# Patient Record
Sex: MALE | Race: WHITE | NOT HISPANIC OR LATINO | Employment: OTHER | URBAN - NONMETROPOLITAN AREA
[De-identification: names, ages, dates, MRNs, and addresses within clinical notes are randomized per-mention and may not be internally consistent; named-entity substitution may affect disease eponyms.]

---

## 2018-08-17 ENCOUNTER — HOSPITAL ENCOUNTER (EMERGENCY)
Facility: HOSPITAL | Age: 58
Discharge: PSYCHIATRIC OR SUBSTANCE USE FACILITY | End: 2018-08-17
Attending: EMERGENCY MEDICINE | Admitting: EMERGENCY MEDICINE

## 2018-08-17 ENCOUNTER — HOSPITAL ENCOUNTER (INPATIENT)
Facility: HOSPITAL | Age: 58
LOS: 5 days | Discharge: HOME | End: 2018-08-22
Attending: PSYCHIATRY & NEUROLOGY | Admitting: PSYCHIATRY & NEUROLOGY

## 2018-08-17 VITALS
BODY MASS INDEX: 20.45 KG/M2 | HEART RATE: 68 BPM | OXYGEN SATURATION: 100 % | SYSTOLIC BLOOD PRESSURE: 108 MMHG | HEIGHT: 72 IN | DIASTOLIC BLOOD PRESSURE: 62 MMHG | TEMPERATURE: 98.2 F | RESPIRATION RATE: 18 BRPM | WEIGHT: 151 LBS

## 2018-08-17 DIAGNOSIS — F10.10 ALCOHOL ABUSE: Primary | ICD-10-CM

## 2018-08-17 DIAGNOSIS — F10.920 ALCOHOLIC INTOXICATION WITHOUT COMPLICATION (HCC): ICD-10-CM

## 2018-08-17 PROBLEM — F10.20 ETOH DEPENDENCE (HCC): Status: ACTIVE | Noted: 2018-08-17

## 2018-08-17 LAB
6-ACETYL MORPHINE: NEGATIVE
ALBUMIN SERPL-MCNC: 3.9 G/DL (ref 3.4–4.8)
ALBUMIN SERPL-MCNC: 4.5 G/DL (ref 3.4–4.8)
ALBUMIN/GLOB SERPL: 1.7 G/DL (ref 1.5–2.5)
ALBUMIN/GLOB SERPL: 1.8 G/DL (ref 1.5–2.5)
ALP SERPL-CCNC: 58 U/L (ref 40–129)
ALP SERPL-CCNC: 66 U/L (ref 40–129)
ALT SERPL W P-5'-P-CCNC: 19 U/L (ref 10–44)
ALT SERPL W P-5'-P-CCNC: 22 U/L (ref 10–44)
AMPHET+METHAMPHET UR QL: NEGATIVE
ANION GAP SERPL CALCULATED.3IONS-SCNC: 10.6 MMOL/L (ref 3.6–11.2)
ANION GAP SERPL CALCULATED.3IONS-SCNC: 6.9 MMOL/L (ref 3.6–11.2)
AST SERPL-CCNC: 27 U/L (ref 10–34)
AST SERPL-CCNC: 30 U/L (ref 10–34)
BARBITURATES UR QL SCN: NEGATIVE
BASOPHILS # BLD AUTO: 0.02 10*3/MM3 (ref 0–0.3)
BASOPHILS NFR BLD AUTO: 0.4 % (ref 0–2)
BENZODIAZ UR QL SCN: NEGATIVE
BILIRUB SERPL-MCNC: 0.4 MG/DL (ref 0.2–1.8)
BILIRUB SERPL-MCNC: 0.5 MG/DL (ref 0.2–1.8)
BILIRUB UR QL STRIP: NEGATIVE
BUN BLD-MCNC: 6 MG/DL (ref 7–21)
BUN BLD-MCNC: 6 MG/DL (ref 7–21)
BUN/CREAT SERPL: 8.3 (ref 7–25)
BUN/CREAT SERPL: 9.4 (ref 7–25)
BUPRENORPHINE SERPL-MCNC: NEGATIVE NG/ML
CALCIUM SPEC-SCNC: 8.5 MG/DL (ref 7.7–10)
CALCIUM SPEC-SCNC: 9 MG/DL (ref 7.7–10)
CANNABINOIDS SERPL QL: NEGATIVE
CHLORIDE SERPL-SCNC: 95 MMOL/L (ref 99–112)
CHLORIDE SERPL-SCNC: 97 MMOL/L (ref 99–112)
CLARITY UR: CLEAR
CO2 SERPL-SCNC: 23.4 MMOL/L (ref 24.3–31.9)
CO2 SERPL-SCNC: 26.1 MMOL/L (ref 24.3–31.9)
COCAINE UR QL: NEGATIVE
COLOR UR: YELLOW
CREAT BLD-MCNC: 0.64 MG/DL (ref 0.43–1.29)
CREAT BLD-MCNC: 0.72 MG/DL (ref 0.43–1.29)
DEPRECATED RDW RBC AUTO: 41.9 FL (ref 37–54)
EOSINOPHIL # BLD AUTO: 0 10*3/MM3 (ref 0–0.7)
EOSINOPHIL NFR BLD AUTO: 0 % (ref 0–5)
ERYTHROCYTE [DISTWIDTH] IN BLOOD BY AUTOMATED COUNT: 12.7 % (ref 11.5–14.5)
ETHANOL BLD-MCNC: 115 MG/DL
ETHANOL BLD-MCNC: 236 MG/DL
ETHANOL UR QL: 0.12 %
ETHANOL UR QL: 0.24 %
GFR SERPL CREATININE-BSD FRML MDRD: 111 ML/MIN/1.73
GFR SERPL CREATININE-BSD FRML MDRD: 128 ML/MIN/1.73
GLOBULIN UR ELPH-MCNC: 2.2 GM/DL
GLOBULIN UR ELPH-MCNC: 2.6 GM/DL
GLUCOSE BLD-MCNC: 74 MG/DL (ref 70–110)
GLUCOSE BLD-MCNC: 91 MG/DL (ref 70–110)
GLUCOSE UR STRIP-MCNC: NEGATIVE MG/DL
HCT VFR BLD AUTO: 36.5 % (ref 42–52)
HGB BLD-MCNC: 12.5 G/DL (ref 14–18)
HGB UR QL STRIP.AUTO: NEGATIVE
IMM GRANULOCYTES # BLD: 0.01 10*3/MM3 (ref 0–0.03)
IMM GRANULOCYTES NFR BLD: 0.2 % (ref 0–0.5)
KETONES UR QL STRIP: NEGATIVE
LEUKOCYTE ESTERASE UR QL STRIP.AUTO: NEGATIVE
LYMPHOCYTES # BLD AUTO: 1.88 10*3/MM3 (ref 1–3)
LYMPHOCYTES NFR BLD AUTO: 38.4 % (ref 21–51)
MAGNESIUM SERPL-MCNC: 1.8 MG/DL (ref 1.7–2.6)
MCH RBC QN AUTO: 32.1 PG (ref 27–33)
MCHC RBC AUTO-ENTMCNC: 34.2 G/DL (ref 33–37)
MCV RBC AUTO: 93.6 FL (ref 80–94)
METHADONE UR QL SCN: NEGATIVE
MONOCYTES # BLD AUTO: 0.49 10*3/MM3 (ref 0.1–0.9)
MONOCYTES NFR BLD AUTO: 10 % (ref 0–10)
NEUTROPHILS # BLD AUTO: 2.49 10*3/MM3 (ref 1.4–6.5)
NEUTROPHILS NFR BLD AUTO: 51 % (ref 30–70)
NITRITE UR QL STRIP: NEGATIVE
OPIATES UR QL: NEGATIVE
OSMOLALITY SERPL CALC.SUM OF ELEC: 254.3 MOSM/KG (ref 273–305)
OSMOLALITY SERPL CALC.SUM OF ELEC: 258.9 MOSM/KG (ref 273–305)
OXYCODONE UR QL SCN: NEGATIVE
PCP UR QL SCN: NEGATIVE
PH UR STRIP.AUTO: 5.5 [PH] (ref 5–8)
PLATELET # BLD AUTO: 231 10*3/MM3 (ref 130–400)
PMV BLD AUTO: 9.3 FL (ref 6–10)
POTASSIUM BLD-SCNC: 3.6 MMOL/L (ref 3.5–5.3)
POTASSIUM BLD-SCNC: 4.2 MMOL/L (ref 3.5–5.3)
PROT SERPL-MCNC: 6.1 G/DL (ref 6–8)
PROT SERPL-MCNC: 7.1 G/DL (ref 6–8)
PROT UR QL STRIP: NEGATIVE
RBC # BLD AUTO: 3.9 10*6/MM3 (ref 4.7–6.1)
SODIUM BLD-SCNC: 128 MMOL/L (ref 135–153)
SODIUM BLD-SCNC: 131 MMOL/L (ref 135–153)
SP GR UR STRIP: 1.01 (ref 1–1.03)
UROBILINOGEN UR QL STRIP: NORMAL
WBC NRBC COR # BLD: 4.89 10*3/MM3 (ref 4.5–12.5)

## 2018-08-17 PROCEDURE — 80307 DRUG TEST PRSMV CHEM ANLYZR: CPT | Performed by: PHYSICIAN ASSISTANT

## 2018-08-17 PROCEDURE — 25010000002 THIAMINE PER 100 MG: Performed by: PHYSICIAN ASSISTANT

## 2018-08-17 PROCEDURE — 96375 TX/PRO/DX INJ NEW DRUG ADDON: CPT

## 2018-08-17 PROCEDURE — 93005 ELECTROCARDIOGRAM TRACING: CPT | Performed by: PSYCHIATRY & NEUROLOGY

## 2018-08-17 PROCEDURE — 96376 TX/PRO/DX INJ SAME DRUG ADON: CPT

## 2018-08-17 PROCEDURE — 99285 EMERGENCY DEPT VISIT HI MDM: CPT

## 2018-08-17 PROCEDURE — 80053 COMPREHEN METABOLIC PANEL: CPT | Performed by: PHYSICIAN ASSISTANT

## 2018-08-17 PROCEDURE — 81003 URINALYSIS AUTO W/O SCOPE: CPT | Performed by: PHYSICIAN ASSISTANT

## 2018-08-17 PROCEDURE — 36415 COLL VENOUS BLD VENIPUNCTURE: CPT

## 2018-08-17 PROCEDURE — 25010000002 ONDANSETRON PER 1 MG: Performed by: EMERGENCY MEDICINE

## 2018-08-17 PROCEDURE — HZ2ZZZZ DETOXIFICATION SERVICES FOR SUBSTANCE ABUSE TREATMENT: ICD-10-PCS | Performed by: PSYCHIATRY & NEUROLOGY

## 2018-08-17 PROCEDURE — 25010000002 LORAZEPAM PER 2 MG: Performed by: EMERGENCY MEDICINE

## 2018-08-17 PROCEDURE — 83735 ASSAY OF MAGNESIUM: CPT | Performed by: PHYSICIAN ASSISTANT

## 2018-08-17 PROCEDURE — 85025 COMPLETE CBC W/AUTO DIFF WBC: CPT | Performed by: PHYSICIAN ASSISTANT

## 2018-08-17 PROCEDURE — 96365 THER/PROPH/DIAG IV INF INIT: CPT

## 2018-08-17 PROCEDURE — 25010000002 ONDANSETRON PER 1 MG: Performed by: PHYSICIAN ASSISTANT

## 2018-08-17 RX ORDER — TRAZODONE HYDROCHLORIDE 50 MG/1
50 TABLET ORAL NIGHTLY PRN
Status: DISCONTINUED | OUTPATIENT
Start: 2018-08-17 | End: 2018-08-22 | Stop reason: HOSPADM

## 2018-08-17 RX ORDER — LORAZEPAM 2 MG/1
2 TABLET ORAL
Status: DISPENSED | OUTPATIENT
Start: 2018-08-17 | End: 2018-08-18

## 2018-08-17 RX ORDER — ONDANSETRON 2 MG/ML
4 INJECTION INTRAMUSCULAR; INTRAVENOUS ONCE
Status: COMPLETED | OUTPATIENT
Start: 2018-08-17 | End: 2018-08-17

## 2018-08-17 RX ORDER — GLUCOSAMINE/D3/BOSWELLIA SERRA 1500MG-400
1 TABLET ORAL DAILY
Status: ON HOLD | COMMUNITY
End: 2021-06-26

## 2018-08-17 RX ORDER — ALUMINA, MAGNESIA, AND SIMETHICONE 2400; 2400; 240 MG/30ML; MG/30ML; MG/30ML
15 SUSPENSION ORAL EVERY 6 HOURS PRN
Status: DISCONTINUED | OUTPATIENT
Start: 2018-08-17 | End: 2018-08-22 | Stop reason: HOSPADM

## 2018-08-17 RX ORDER — ECHINACEA PURPUREA EXTRACT 125 MG
2 TABLET ORAL AS NEEDED
Status: DISCONTINUED | OUTPATIENT
Start: 2018-08-17 | End: 2018-08-22 | Stop reason: HOSPADM

## 2018-08-17 RX ORDER — BENZTROPINE MESYLATE 1 MG/ML
0.5 INJECTION INTRAMUSCULAR; INTRAVENOUS DAILY PRN
Status: DISCONTINUED | OUTPATIENT
Start: 2018-08-17 | End: 2018-08-22 | Stop reason: HOSPADM

## 2018-08-17 RX ORDER — LORAZEPAM 0.5 MG/1
0.5 TABLET ORAL
Status: COMPLETED | OUTPATIENT
Start: 2018-08-21 | End: 2018-08-21

## 2018-08-17 RX ORDER — GABAPENTIN 400 MG/1
400 CAPSULE ORAL 4 TIMES DAILY
Status: CANCELLED | OUTPATIENT
Start: 2018-08-18

## 2018-08-17 RX ORDER — NICOTINE 21 MG/24HR
1 PATCH, TRANSDERMAL 24 HOURS TRANSDERMAL EVERY 24 HOURS
Status: DISCONTINUED | OUTPATIENT
Start: 2018-08-18 | End: 2018-08-22 | Stop reason: HOSPADM

## 2018-08-17 RX ORDER — LORAZEPAM 0.5 MG/1
0.5 TABLET ORAL EVERY 4 HOURS PRN
Status: ACTIVE | OUTPATIENT
Start: 2018-08-21 | End: 2018-08-22

## 2018-08-17 RX ORDER — LORAZEPAM 1 MG/1
1 TABLET ORAL EVERY 4 HOURS PRN
Status: ACTIVE | OUTPATIENT
Start: 2018-08-20 | End: 2018-08-21

## 2018-08-17 RX ORDER — LORAZEPAM 1 MG/1
1 TABLET ORAL
Status: COMPLETED | OUTPATIENT
Start: 2018-08-20 | End: 2018-08-20

## 2018-08-17 RX ORDER — HYDROXYZINE 50 MG/1
50 TABLET, FILM COATED ORAL EVERY 6 HOURS PRN
Status: DISCONTINUED | OUTPATIENT
Start: 2018-08-17 | End: 2018-08-22 | Stop reason: HOSPADM

## 2018-08-17 RX ORDER — LORAZEPAM 2 MG/1
2 TABLET ORAL
Status: COMPLETED | OUTPATIENT
Start: 2018-08-18 | End: 2018-08-18

## 2018-08-17 RX ORDER — ONDANSETRON 4 MG/1
4 TABLET, FILM COATED ORAL EVERY 6 HOURS PRN
Status: DISCONTINUED | OUTPATIENT
Start: 2018-08-17 | End: 2018-08-22 | Stop reason: HOSPADM

## 2018-08-17 RX ORDER — GABAPENTIN 400 MG/1
400 CAPSULE ORAL 4 TIMES DAILY
Status: ON HOLD | COMMUNITY
End: 2021-06-26

## 2018-08-17 RX ORDER — LOPERAMIDE HYDROCHLORIDE 2 MG/1
2 CAPSULE ORAL 4 TIMES DAILY PRN
Status: DISCONTINUED | OUTPATIENT
Start: 2018-08-17 | End: 2018-08-22 | Stop reason: HOSPADM

## 2018-08-17 RX ORDER — THIAMINE HCL 50 MG
100 TABLET ORAL DAILY
Status: DISCONTINUED | OUTPATIENT
Start: 2018-08-18 | End: 2018-08-22 | Stop reason: HOSPADM

## 2018-08-17 RX ORDER — LORAZEPAM 2 MG/1
2 TABLET ORAL EVERY 4 HOURS PRN
Status: ACTIVE | OUTPATIENT
Start: 2018-08-18 | End: 2018-08-19

## 2018-08-17 RX ORDER — SODIUM CHLORIDE 0.9 % (FLUSH) 0.9 %
10 SYRINGE (ML) INJECTION AS NEEDED
Status: DISCONTINUED | OUTPATIENT
Start: 2018-08-17 | End: 2018-08-17 | Stop reason: HOSPADM

## 2018-08-17 RX ORDER — FAMOTIDINE 20 MG/1
20 TABLET, FILM COATED ORAL 2 TIMES DAILY PRN
Status: DISCONTINUED | OUTPATIENT
Start: 2018-08-17 | End: 2018-08-22 | Stop reason: HOSPADM

## 2018-08-17 RX ORDER — LORAZEPAM 2 MG/ML
1 INJECTION INTRAMUSCULAR ONCE
Status: COMPLETED | OUTPATIENT
Start: 2018-08-17 | End: 2018-08-17

## 2018-08-17 RX ORDER — BENZTROPINE MESYLATE 1 MG/1
1 TABLET ORAL DAILY PRN
Status: DISCONTINUED | OUTPATIENT
Start: 2018-08-17 | End: 2018-08-22 | Stop reason: HOSPADM

## 2018-08-17 RX ORDER — BENZONATATE 100 MG/1
100 CAPSULE ORAL 3 TIMES DAILY PRN
Status: DISCONTINUED | OUTPATIENT
Start: 2018-08-17 | End: 2018-08-22 | Stop reason: HOSPADM

## 2018-08-17 RX ORDER — MULTIVITAMIN
1 TABLET ORAL DAILY
Status: DISCONTINUED | OUTPATIENT
Start: 2018-08-18 | End: 2018-08-22 | Stop reason: HOSPADM

## 2018-08-17 RX ORDER — GABAPENTIN 100 MG/1
CAPSULE ORAL
Status: ON HOLD | COMMUNITY
End: 2018-08-17 | Stop reason: DRUGHIGH

## 2018-08-17 RX ORDER — IBUPROFEN 600 MG/1
600 TABLET ORAL EVERY 6 HOURS PRN
Status: DISCONTINUED | OUTPATIENT
Start: 2018-08-17 | End: 2018-08-22 | Stop reason: HOSPADM

## 2018-08-17 RX ORDER — GLUCOSAMINE/D3/BOSWELLIA SERRA 1500MG-400
1 TABLET ORAL DAILY
Status: CANCELLED | OUTPATIENT
Start: 2018-08-18

## 2018-08-17 RX ADMIN — LORAZEPAM 1 MG: 2 INJECTION INTRAMUSCULAR; INTRAVENOUS at 19:47

## 2018-08-17 RX ADMIN — IBUPROFEN 600 MG: 600 TABLET ORAL at 23:59

## 2018-08-17 RX ADMIN — THIAMINE HYDROCHLORIDE 999 ML/HR: 100 INJECTION, SOLUTION INTRAMUSCULAR; INTRAVENOUS at 18:12

## 2018-08-17 RX ADMIN — ONDANSETRON 4 MG: 2 INJECTION, SOLUTION INTRAMUSCULAR; INTRAVENOUS at 19:43

## 2018-08-17 RX ADMIN — ONDANSETRON 4 MG: 4 TABLET, FILM COATED ORAL at 23:59

## 2018-08-17 RX ADMIN — ONDANSETRON 4 MG: 2 INJECTION, SOLUTION INTRAMUSCULAR; INTRAVENOUS at 18:20

## 2018-08-18 PROBLEM — F10.239 ALCOHOL DEPENDENCE WITH WITHDRAWAL (HCC): Status: ACTIVE | Noted: 2018-08-17

## 2018-08-18 PROCEDURE — 99223 1ST HOSP IP/OBS HIGH 75: CPT | Performed by: PSYCHIATRY & NEUROLOGY

## 2018-08-18 RX ORDER — GABAPENTIN 400 MG/1
400 CAPSULE ORAL EVERY 8 HOURS SCHEDULED
Status: DISCONTINUED | OUTPATIENT
Start: 2018-08-18 | End: 2018-08-22 | Stop reason: HOSPADM

## 2018-08-18 RX ADMIN — LORAZEPAM 2 MG: 2 TABLET ORAL at 14:32

## 2018-08-18 RX ADMIN — LORAZEPAM 2 MG: 2 TABLET ORAL at 08:59

## 2018-08-18 RX ADMIN — THIAMINE HCL (VITAMIN B1) 50 MG TABLET 100 MG: at 08:58

## 2018-08-18 RX ADMIN — TRAZODONE HYDROCHLORIDE 50 MG: 50 TABLET ORAL at 21:46

## 2018-08-18 RX ADMIN — LORAZEPAM 2 MG: 2 TABLET ORAL at 01:47

## 2018-08-18 RX ADMIN — TRAZODONE HYDROCHLORIDE 50 MG: 50 TABLET ORAL at 01:41

## 2018-08-18 RX ADMIN — GABAPENTIN 400 MG: 400 CAPSULE ORAL at 15:31

## 2018-08-18 RX ADMIN — FAMOTIDINE 20 MG: 20 TABLET, FILM COATED ORAL at 18:17

## 2018-08-18 RX ADMIN — IBUPROFEN 600 MG: 600 TABLET ORAL at 08:59

## 2018-08-18 RX ADMIN — ONDANSETRON 4 MG: 4 TABLET, FILM COATED ORAL at 14:33

## 2018-08-18 RX ADMIN — LORAZEPAM 2 MG: 2 TABLET ORAL at 21:46

## 2018-08-18 RX ADMIN — GABAPENTIN 400 MG: 400 CAPSULE ORAL at 21:46

## 2018-08-19 PROBLEM — E55.9 VITAMIN D DEFICIENCY: Status: ACTIVE | Noted: 2018-08-19

## 2018-08-19 LAB
25(OH)D3 SERPL-MCNC: 23 NG/ML
ANION GAP SERPL CALCULATED.3IONS-SCNC: 5.2 MMOL/L (ref 3.6–11.2)
BUN BLD-MCNC: 9 MG/DL (ref 7–21)
BUN/CREAT SERPL: 12 (ref 7–25)
CALCIUM SPEC-SCNC: 9.1 MG/DL (ref 7.7–10)
CHLORIDE SERPL-SCNC: 103 MMOL/L (ref 99–112)
CO2 SERPL-SCNC: 26.8 MMOL/L (ref 24.3–31.9)
CREAT BLD-MCNC: 0.75 MG/DL (ref 0.43–1.29)
GFR SERPL CREATININE-BSD FRML MDRD: 106 ML/MIN/1.73
GLUCOSE BLD-MCNC: 98 MG/DL (ref 70–110)
OSMOLALITY SERPL CALC.SUM OF ELEC: 268.8 MOSM/KG (ref 273–305)
POTASSIUM BLD-SCNC: 4 MMOL/L (ref 3.5–5.3)
SODIUM BLD-SCNC: 135 MMOL/L (ref 135–153)
TSH SERPL DL<=0.05 MIU/L-ACNC: 0.74 MIU/ML (ref 0.55–4.78)
VIT B12 BLD-MCNC: 371 PG/ML (ref 211–911)

## 2018-08-19 PROCEDURE — 82607 VITAMIN B-12: CPT | Performed by: PSYCHIATRY & NEUROLOGY

## 2018-08-19 PROCEDURE — 84443 ASSAY THYROID STIM HORMONE: CPT | Performed by: PSYCHIATRY & NEUROLOGY

## 2018-08-19 PROCEDURE — 82306 VITAMIN D 25 HYDROXY: CPT | Performed by: PSYCHIATRY & NEUROLOGY

## 2018-08-19 PROCEDURE — 99232 SBSQ HOSP IP/OBS MODERATE 35: CPT | Performed by: PSYCHIATRY & NEUROLOGY

## 2018-08-19 PROCEDURE — 80048 BASIC METABOLIC PNL TOTAL CA: CPT | Performed by: PSYCHIATRY & NEUROLOGY

## 2018-08-19 RX ORDER — HYDROXYZINE HYDROCHLORIDE 25 MG/1
TABLET, FILM COATED ORAL
Status: COMPLETED
Start: 2018-08-19 | End: 2018-08-19

## 2018-08-19 RX ORDER — FLUOXETINE HYDROCHLORIDE 20 MG/1
20 CAPSULE ORAL DAILY
Status: DISCONTINUED | OUTPATIENT
Start: 2018-08-19 | End: 2018-08-22 | Stop reason: HOSPADM

## 2018-08-19 RX ADMIN — THIAMINE HCL (VITAMIN B1) 50 MG TABLET 100 MG: at 08:35

## 2018-08-19 RX ADMIN — TRAZODONE HYDROCHLORIDE 50 MG: 50 TABLET ORAL at 21:26

## 2018-08-19 RX ADMIN — LORAZEPAM 1.5 MG: 0.5 TABLET ORAL at 14:11

## 2018-08-19 RX ADMIN — HYDROXYZINE HYDROCHLORIDE 50 MG: 50 TABLET, FILM COATED ORAL at 21:29

## 2018-08-19 RX ADMIN — IBUPROFEN 600 MG: 600 TABLET ORAL at 08:35

## 2018-08-19 RX ADMIN — FLUOXETINE 20 MG: 20 CAPSULE ORAL at 16:11

## 2018-08-19 RX ADMIN — GABAPENTIN 400 MG: 400 CAPSULE ORAL at 21:27

## 2018-08-19 RX ADMIN — CHOLECALCIFEROL CAP 125 MCG (5000 UNIT) 5000 UNITS: 125 CAP at 16:11

## 2018-08-19 RX ADMIN — LORAZEPAM 1.5 MG: 0.5 TABLET ORAL at 21:27

## 2018-08-19 RX ADMIN — LORAZEPAM 1.5 MG: 0.5 TABLET ORAL at 08:35

## 2018-08-19 RX ADMIN — HYDROXYZINE HYDROCHLORIDE 50 MG: 25 TABLET ORAL at 21:29

## 2018-08-19 RX ADMIN — GABAPENTIN 400 MG: 400 CAPSULE ORAL at 14:11

## 2018-08-19 RX ADMIN — GABAPENTIN 400 MG: 400 CAPSULE ORAL at 06:13

## 2018-08-19 RX ADMIN — Medication 1 TABLET: at 08:35

## 2018-08-20 PROCEDURE — 99232 SBSQ HOSP IP/OBS MODERATE 35: CPT | Performed by: PSYCHIATRY & NEUROLOGY

## 2018-08-20 RX ADMIN — CHOLECALCIFEROL CAP 125 MCG (5000 UNIT) 5000 UNITS: 125 CAP at 09:26

## 2018-08-20 RX ADMIN — GABAPENTIN 400 MG: 400 CAPSULE ORAL at 21:27

## 2018-08-20 RX ADMIN — THIAMINE HCL (VITAMIN B1) 50 MG TABLET 100 MG: at 09:26

## 2018-08-20 RX ADMIN — GABAPENTIN 400 MG: 400 CAPSULE ORAL at 14:14

## 2018-08-20 RX ADMIN — FLUOXETINE 20 MG: 20 CAPSULE ORAL at 09:26

## 2018-08-20 RX ADMIN — GABAPENTIN 400 MG: 400 CAPSULE ORAL at 06:17

## 2018-08-20 RX ADMIN — LORAZEPAM 1 MG: 1 TABLET ORAL at 14:14

## 2018-08-20 RX ADMIN — LORAZEPAM 1 MG: 1 TABLET ORAL at 21:27

## 2018-08-20 RX ADMIN — IBUPROFEN 600 MG: 600 TABLET ORAL at 21:27

## 2018-08-20 RX ADMIN — LORAZEPAM 1 MG: 1 TABLET ORAL at 09:26

## 2018-08-20 RX ADMIN — TRAZODONE HYDROCHLORIDE 50 MG: 50 TABLET ORAL at 21:26

## 2018-08-20 RX ADMIN — HYDROXYZINE HYDROCHLORIDE 50 MG: 50 TABLET, FILM COATED ORAL at 21:26

## 2018-08-20 RX ADMIN — Medication 1 TABLET: at 09:26

## 2018-08-21 PROCEDURE — 99231 SBSQ HOSP IP/OBS SF/LOW 25: CPT | Performed by: PSYCHIATRY & NEUROLOGY

## 2018-08-21 RX ADMIN — LORAZEPAM 0.5 MG: 0.5 TABLET ORAL at 14:42

## 2018-08-21 RX ADMIN — IBUPROFEN 600 MG: 600 TABLET ORAL at 14:42

## 2018-08-21 RX ADMIN — CHOLECALCIFEROL CAP 125 MCG (5000 UNIT) 5000 UNITS: 125 CAP at 08:26

## 2018-08-21 RX ADMIN — Medication 1 TABLET: at 08:26

## 2018-08-21 RX ADMIN — FLUOXETINE 20 MG: 20 CAPSULE ORAL at 08:26

## 2018-08-21 RX ADMIN — THIAMINE HCL (VITAMIN B1) 50 MG TABLET 100 MG: at 08:26

## 2018-08-21 RX ADMIN — LORAZEPAM 0.5 MG: 0.5 TABLET ORAL at 21:11

## 2018-08-21 RX ADMIN — GABAPENTIN 400 MG: 400 CAPSULE ORAL at 21:11

## 2018-08-21 RX ADMIN — GABAPENTIN 400 MG: 400 CAPSULE ORAL at 06:09

## 2018-08-21 RX ADMIN — IBUPROFEN 600 MG: 600 TABLET ORAL at 08:27

## 2018-08-21 RX ADMIN — IBUPROFEN 600 MG: 600 TABLET ORAL at 21:11

## 2018-08-21 RX ADMIN — GABAPENTIN 400 MG: 400 CAPSULE ORAL at 14:41

## 2018-08-21 RX ADMIN — HYDROXYZINE HYDROCHLORIDE 50 MG: 50 TABLET, FILM COATED ORAL at 21:11

## 2018-08-21 RX ADMIN — HYDROXYZINE HYDROCHLORIDE 50 MG: 50 TABLET, FILM COATED ORAL at 14:41

## 2018-08-21 RX ADMIN — TRAZODONE HYDROCHLORIDE 50 MG: 50 TABLET ORAL at 21:11

## 2018-08-21 RX ADMIN — LORAZEPAM 0.5 MG: 0.5 TABLET ORAL at 08:27

## 2018-08-22 VITALS
HEIGHT: 72 IN | HEART RATE: 73 BPM | BODY MASS INDEX: 19.91 KG/M2 | OXYGEN SATURATION: 96 % | DIASTOLIC BLOOD PRESSURE: 72 MMHG | SYSTOLIC BLOOD PRESSURE: 107 MMHG | RESPIRATION RATE: 18 BRPM | TEMPERATURE: 98.1 F | WEIGHT: 147 LBS

## 2018-08-22 PROCEDURE — 99238 HOSP IP/OBS DSCHRG MGMT 30/<: CPT | Performed by: PSYCHIATRY & NEUROLOGY

## 2018-08-22 RX ORDER — HYDROXYZINE 50 MG/1
50 TABLET, FILM COATED ORAL EVERY 8 HOURS PRN
Qty: 60 TABLET | Refills: 0 | Status: SHIPPED | OUTPATIENT
Start: 2018-08-22 | End: 2020-01-07

## 2018-08-22 RX ORDER — FLUOXETINE HYDROCHLORIDE 20 MG/1
20 CAPSULE ORAL DAILY
Qty: 30 CAPSULE | Refills: 0 | Status: SHIPPED | OUTPATIENT
Start: 2018-08-23 | End: 2020-01-07

## 2018-08-22 RX ORDER — LANOLIN ALCOHOL/MO/W.PET/CERES
100 CREAM (GRAM) TOPICAL DAILY
Qty: 30 TABLET | Refills: 0 | Status: SHIPPED | OUTPATIENT
Start: 2018-08-23 | End: 2020-01-07

## 2018-08-22 RX ADMIN — GABAPENTIN 400 MG: 400 CAPSULE ORAL at 14:14

## 2018-08-22 RX ADMIN — HYDROXYZINE HYDROCHLORIDE 50 MG: 50 TABLET, FILM COATED ORAL at 09:20

## 2018-08-22 RX ADMIN — FLUOXETINE 20 MG: 20 CAPSULE ORAL at 09:16

## 2018-08-22 RX ADMIN — IBUPROFEN 600 MG: 600 TABLET ORAL at 11:49

## 2018-08-22 RX ADMIN — THIAMINE HCL (VITAMIN B1) 50 MG TABLET 100 MG: at 09:16

## 2018-08-22 RX ADMIN — GABAPENTIN 400 MG: 400 CAPSULE ORAL at 06:03

## 2018-08-22 RX ADMIN — CHOLECALCIFEROL CAP 125 MCG (5000 UNIT) 5000 UNITS: 125 CAP at 09:16

## 2018-08-22 RX ADMIN — Medication 1 TABLET: at 09:16

## 2019-07-16 ENCOUNTER — TRANSCRIBE ORDERS (OUTPATIENT)
Dept: ADMINISTRATIVE | Facility: HOSPITAL | Age: 59
End: 2019-07-16

## 2019-07-16 DIAGNOSIS — Z87.891 PERSONAL HISTORY OF TOBACCO USE, PRESENTING HAZARDS TO HEALTH: Primary | ICD-10-CM

## 2019-10-28 ENCOUNTER — TRANSCRIBE ORDERS (OUTPATIENT)
Dept: LAB | Facility: HOSPITAL | Age: 59
End: 2019-10-28

## 2019-10-28 ENCOUNTER — HOSPITAL ENCOUNTER (OUTPATIENT)
Dept: GENERAL RADIOLOGY | Facility: HOSPITAL | Age: 59
Discharge: HOME | End: 2019-10-28
Admitting: FAMILY MEDICINE

## 2019-10-28 DIAGNOSIS — M54.50 LOW BACK PAIN, UNSPECIFIED BACK PAIN LATERALITY, UNSPECIFIED CHRONICITY, UNSPECIFIED WHETHER SCIATICA PRESENT: ICD-10-CM

## 2019-10-28 DIAGNOSIS — M54.50 LOW BACK PAIN, UNSPECIFIED BACK PAIN LATERALITY, UNSPECIFIED CHRONICITY, UNSPECIFIED WHETHER SCIATICA PRESENT: Primary | ICD-10-CM

## 2019-10-28 PROCEDURE — 72110 X-RAY EXAM L-2 SPINE 4/>VWS: CPT | Performed by: RADIOLOGY

## 2019-10-28 PROCEDURE — 72110 X-RAY EXAM L-2 SPINE 4/>VWS: CPT

## 2020-01-07 ENCOUNTER — DISEASE STATE MANAGEMENT VISIT (OUTPATIENT)
Dept: PHARMACY | Facility: HOSPITAL | Age: 60
End: 2020-01-07

## 2020-01-07 RX ORDER — CYCLOBENZAPRINE HCL 5 MG
5 TABLET ORAL NIGHTLY PRN
Status: ON HOLD | COMMUNITY
End: 2021-06-26

## 2020-01-07 RX ORDER — IPRATROPIUM BROMIDE AND ALBUTEROL SULFATE 2.5; .5 MG/3ML; MG/3ML
3 SOLUTION RESPIRATORY (INHALATION) EVERY 4 HOURS PRN
Status: ON HOLD | COMMUNITY
End: 2021-06-26

## 2020-01-07 RX ORDER — QUETIAPINE FUMARATE 100 MG/1
100 TABLET, FILM COATED ORAL NIGHTLY
COMMUNITY

## 2020-01-07 RX ORDER — ALBUTEROL SULFATE 2.5 MG/3ML
2.5 SOLUTION RESPIRATORY (INHALATION) EVERY 4 HOURS PRN
Status: ON HOLD | COMMUNITY
End: 2021-06-26

## 2020-01-07 RX ORDER — PANTOPRAZOLE SODIUM 40 MG/1
40 TABLET, DELAYED RELEASE ORAL DAILY
Status: ON HOLD | COMMUNITY
End: 2021-06-26

## 2020-01-21 ENCOUNTER — TELEPHONE (OUTPATIENT)
Dept: PHARMACY | Facility: HOSPITAL | Age: 60
End: 2020-01-21

## 2020-07-27 ENCOUNTER — TRANSCRIBE ORDERS (OUTPATIENT)
Dept: ADMINISTRATIVE | Facility: HOSPITAL | Age: 60
End: 2020-07-27

## 2020-07-27 DIAGNOSIS — Z87.891 PERSONAL HISTORY OF NICOTINE DEPENDENCE: Primary | ICD-10-CM

## 2020-08-04 ENCOUNTER — HOSPITAL ENCOUNTER (OUTPATIENT)
Dept: CT IMAGING | Facility: HOSPITAL | Age: 60
Discharge: HOME | End: 2020-08-04
Admitting: FAMILY MEDICINE

## 2020-08-04 DIAGNOSIS — Z87.891 PERSONAL HISTORY OF NICOTINE DEPENDENCE: ICD-10-CM

## 2020-08-04 PROCEDURE — G0297 LDCT FOR LUNG CA SCREEN: HCPCS | Performed by: RADIOLOGY

## 2020-08-04 PROCEDURE — G0297 LDCT FOR LUNG CA SCREEN: HCPCS

## 2020-10-07 ENCOUNTER — TRANSCRIBE ORDERS (OUTPATIENT)
Dept: ULTRASOUND IMAGING | Facility: HOSPITAL | Age: 60
End: 2020-10-07

## 2020-10-07 DIAGNOSIS — R22.31 LOCALIZED SWELLING, MASS, OR LUMP OF UPPER EXTREMITY, RIGHT: Primary | ICD-10-CM

## 2020-10-08 ENCOUNTER — APPOINTMENT (OUTPATIENT)
Dept: ULTRASOUND IMAGING | Facility: HOSPITAL | Age: 60
End: 2020-10-08

## 2020-10-12 ENCOUNTER — HOSPITAL ENCOUNTER (OUTPATIENT)
Dept: ULTRASOUND IMAGING | Facility: HOSPITAL | Age: 60
Discharge: HOME | End: 2020-10-12

## 2020-10-12 ENCOUNTER — TRANSCRIBE ORDERS (OUTPATIENT)
Dept: LAB | Facility: HOSPITAL | Age: 60
End: 2020-10-12

## 2020-10-12 DIAGNOSIS — R22.31 LOCALIZED SWELLING, MASS, OR LUMP OF UPPER EXTREMITY, RIGHT: ICD-10-CM

## 2020-10-12 DIAGNOSIS — R22.31 MASS OF FINGER OF RIGHT HAND: Primary | ICD-10-CM

## 2020-10-12 DIAGNOSIS — M54.2 CERVICALGIA: ICD-10-CM

## 2020-10-12 PROCEDURE — 93880 EXTRACRANIAL BILAT STUDY: CPT

## 2020-10-12 PROCEDURE — 76882 US LMTD JT/FCL EVL NVASC XTR: CPT

## 2020-10-12 PROCEDURE — 76882 US LMTD JT/FCL EVL NVASC XTR: CPT | Performed by: RADIOLOGY

## 2020-10-12 PROCEDURE — 93880 EXTRACRANIAL BILAT STUDY: CPT | Performed by: RADIOLOGY

## 2021-06-03 ENCOUNTER — TRANSCRIBE ORDERS (OUTPATIENT)
Dept: ADMINISTRATIVE | Facility: HOSPITAL | Age: 61
End: 2021-06-03

## 2021-06-03 DIAGNOSIS — R91.1 COIN LESION: Primary | ICD-10-CM

## 2021-06-25 ENCOUNTER — HOSPITAL ENCOUNTER (EMERGENCY)
Facility: HOSPITAL | Age: 61
Discharge: PSYCHIATRIC OR SUBSTANCE USE FACILITY | End: 2021-06-26
Attending: EMERGENCY MEDICINE | Admitting: EMERGENCY MEDICINE

## 2021-06-25 DIAGNOSIS — F10.20 ALCOHOLISM (HCC): Primary | ICD-10-CM

## 2021-06-25 LAB
6-ACETYL MORPHINE: NEGATIVE
ALBUMIN SERPL-MCNC: 4.46 G/DL (ref 3.5–5.2)
ALBUMIN/GLOB SERPL: 1.7 G/DL
ALP SERPL-CCNC: 69 U/L (ref 39–117)
ALT SERPL W P-5'-P-CCNC: 78 U/L (ref 1–41)
AMPHET+METHAMPHET UR QL: NEGATIVE
ANION GAP SERPL CALCULATED.3IONS-SCNC: 12.6 MMOL/L (ref 5–15)
AST SERPL-CCNC: 129 U/L (ref 1–40)
BARBITURATES UR QL SCN: NEGATIVE
BASOPHILS # BLD AUTO: 0.06 10*3/MM3 (ref 0–0.2)
BASOPHILS NFR BLD AUTO: 1.1 % (ref 0–1.5)
BENZODIAZ UR QL SCN: NEGATIVE
BILIRUB SERPL-MCNC: 0.5 MG/DL (ref 0–1.2)
BILIRUB UR QL STRIP: NEGATIVE
BUN SERPL-MCNC: 8 MG/DL (ref 8–23)
BUN/CREAT SERPL: 9.4 (ref 7–25)
BUPRENORPHINE SERPL-MCNC: NEGATIVE NG/ML
CALCIUM SPEC-SCNC: 9.4 MG/DL (ref 8.6–10.5)
CANNABINOIDS SERPL QL: POSITIVE
CHLORIDE SERPL-SCNC: 102 MMOL/L (ref 98–107)
CLARITY UR: CLEAR
CO2 SERPL-SCNC: 27.4 MMOL/L (ref 22–29)
COCAINE UR QL: NEGATIVE
COLOR UR: YELLOW
CREAT SERPL-MCNC: 0.85 MG/DL (ref 0.76–1.27)
DEPRECATED RDW RBC AUTO: 46.6 FL (ref 37–54)
EOSINOPHIL # BLD AUTO: 0.02 10*3/MM3 (ref 0–0.4)
EOSINOPHIL NFR BLD AUTO: 0.4 % (ref 0.3–6.2)
ERYTHROCYTE [DISTWIDTH] IN BLOOD BY AUTOMATED COUNT: 13.1 % (ref 12.3–15.4)
ETHANOL BLD-MCNC: 299 MG/DL (ref 0–10)
ETHANOL UR QL: 0.3 %
FLUAV RNA RESP QL NAA+PROBE: NOT DETECTED
FLUBV RNA RESP QL NAA+PROBE: NOT DETECTED
GFR SERPL CREATININE-BSD FRML MDRD: 91 ML/MIN/1.73
GLOBULIN UR ELPH-MCNC: 2.6 GM/DL
GLUCOSE SERPL-MCNC: 163 MG/DL (ref 65–99)
GLUCOSE UR STRIP-MCNC: NEGATIVE MG/DL
HCT VFR BLD AUTO: 39.9 % (ref 37.5–51)
HGB BLD-MCNC: 13.5 G/DL (ref 13–17.7)
HGB UR QL STRIP.AUTO: NEGATIVE
IMM GRANULOCYTES # BLD AUTO: 0.01 10*3/MM3 (ref 0–0.05)
IMM GRANULOCYTES NFR BLD AUTO: 0.2 % (ref 0–0.5)
KETONES UR QL STRIP: NEGATIVE
LEUKOCYTE ESTERASE UR QL STRIP.AUTO: NEGATIVE
LIPASE SERPL-CCNC: 42 U/L (ref 13–60)
LYMPHOCYTES # BLD AUTO: 2.09 10*3/MM3 (ref 0.7–3.1)
LYMPHOCYTES NFR BLD AUTO: 39.1 % (ref 19.6–45.3)
MAGNESIUM SERPL-MCNC: 2 MG/DL (ref 1.6–2.4)
MCH RBC QN AUTO: 32.8 PG (ref 26.6–33)
MCHC RBC AUTO-ENTMCNC: 33.8 G/DL (ref 31.5–35.7)
MCV RBC AUTO: 96.8 FL (ref 79–97)
METHADONE UR QL SCN: NEGATIVE
MONOCYTES # BLD AUTO: 0.68 10*3/MM3 (ref 0.1–0.9)
MONOCYTES NFR BLD AUTO: 12.7 % (ref 5–12)
NEUTROPHILS NFR BLD AUTO: 2.49 10*3/MM3 (ref 1.7–7)
NEUTROPHILS NFR BLD AUTO: 46.5 % (ref 42.7–76)
NITRITE UR QL STRIP: NEGATIVE
NRBC BLD AUTO-RTO: 0 /100 WBC (ref 0–0.2)
OPIATES UR QL: NEGATIVE
OXYCODONE UR QL SCN: NEGATIVE
PCP UR QL SCN: NEGATIVE
PH UR STRIP.AUTO: 6.5 [PH] (ref 5–8)
PLATELET # BLD AUTO: 249 10*3/MM3 (ref 140–450)
PMV BLD AUTO: 9.5 FL (ref 6–12)
POTASSIUM SERPL-SCNC: 3.9 MMOL/L (ref 3.5–5.2)
PROT SERPL-MCNC: 7.1 G/DL (ref 6–8.5)
PROT UR QL STRIP: NEGATIVE
RBC # BLD AUTO: 4.12 10*6/MM3 (ref 4.14–5.8)
SARS-COV-2 RNA RESP QL NAA+PROBE: NOT DETECTED
SODIUM SERPL-SCNC: 142 MMOL/L (ref 136–145)
SP GR UR STRIP: 1.01 (ref 1–1.03)
UROBILINOGEN UR QL STRIP: NORMAL
WBC # BLD AUTO: 5.35 10*3/MM3 (ref 3.4–10.8)

## 2021-06-25 PROCEDURE — 85025 COMPLETE CBC W/AUTO DIFF WBC: CPT | Performed by: EMERGENCY MEDICINE

## 2021-06-25 PROCEDURE — 96365 THER/PROPH/DIAG IV INF INIT: CPT

## 2021-06-25 PROCEDURE — 80307 DRUG TEST PRSMV CHEM ANLYZR: CPT | Performed by: EMERGENCY MEDICINE

## 2021-06-25 PROCEDURE — 82077 ASSAY SPEC XCP UR&BREATH IA: CPT | Performed by: EMERGENCY MEDICINE

## 2021-06-25 PROCEDURE — 25010000002 THIAMINE PER 100 MG: Performed by: EMERGENCY MEDICINE

## 2021-06-25 PROCEDURE — 80053 COMPREHEN METABOLIC PANEL: CPT | Performed by: EMERGENCY MEDICINE

## 2021-06-25 PROCEDURE — 87636 SARSCOV2 & INF A&B AMP PRB: CPT | Performed by: EMERGENCY MEDICINE

## 2021-06-25 PROCEDURE — 99285 EMERGENCY DEPT VISIT HI MDM: CPT

## 2021-06-25 PROCEDURE — 36415 COLL VENOUS BLD VENIPUNCTURE: CPT

## 2021-06-25 PROCEDURE — 81003 URINALYSIS AUTO W/O SCOPE: CPT | Performed by: EMERGENCY MEDICINE

## 2021-06-25 PROCEDURE — 83735 ASSAY OF MAGNESIUM: CPT | Performed by: EMERGENCY MEDICINE

## 2021-06-25 PROCEDURE — 83690 ASSAY OF LIPASE: CPT | Performed by: EMERGENCY MEDICINE

## 2021-06-25 RX ORDER — LORAZEPAM 1 MG/1
1 TABLET ORAL ONCE
Status: COMPLETED | OUTPATIENT
Start: 2021-06-25 | End: 2021-06-25

## 2021-06-25 RX ADMIN — LORAZEPAM 1 MG: 1 TABLET ORAL at 21:45

## 2021-06-25 RX ADMIN — SODIUM CHLORIDE 1000 ML: 9 INJECTION, SOLUTION INTRAVENOUS at 23:18

## 2021-06-25 RX ADMIN — THIAMINE HYDROCHLORIDE 1000 ML/HR: 100 INJECTION, SOLUTION INTRAMUSCULAR; INTRAVENOUS at 21:36

## 2021-06-26 ENCOUNTER — HOSPITAL ENCOUNTER (INPATIENT)
Facility: HOSPITAL | Age: 61
LOS: 5 days | Discharge: HOME | End: 2021-07-01
Attending: PSYCHIATRY & NEUROLOGY | Admitting: PSYCHIATRY & NEUROLOGY

## 2021-06-26 VITALS
HEART RATE: 83 BPM | HEIGHT: 72 IN | SYSTOLIC BLOOD PRESSURE: 147 MMHG | OXYGEN SATURATION: 96 % | TEMPERATURE: 98.9 F | DIASTOLIC BLOOD PRESSURE: 88 MMHG | BODY MASS INDEX: 19.1 KG/M2 | WEIGHT: 141 LBS | RESPIRATION RATE: 17 BRPM

## 2021-06-26 PROBLEM — F10.20 ETOH DEPENDENCE (HCC): Status: ACTIVE | Noted: 2021-06-26

## 2021-06-26 LAB
ETHANOL BLD-MCNC: 117 MG/DL (ref 0–10)
ETHANOL BLD-MCNC: 76 MG/DL (ref 0–10)
ETHANOL UR QL: 0.08 %
ETHANOL UR QL: 0.12 %
QT INTERVAL: 398 MS
QTC INTERVAL: 459 MS

## 2021-06-26 PROCEDURE — 82077 ASSAY SPEC XCP UR&BREATH IA: CPT | Performed by: EMERGENCY MEDICINE

## 2021-06-26 PROCEDURE — 94640 AIRWAY INHALATION TREATMENT: CPT

## 2021-06-26 PROCEDURE — HZ2ZZZZ DETOXIFICATION SERVICES FOR SUBSTANCE ABUSE TREATMENT: ICD-10-PCS | Performed by: PSYCHIATRY & NEUROLOGY

## 2021-06-26 PROCEDURE — 93010 ELECTROCARDIOGRAM REPORT: CPT | Performed by: INTERNAL MEDICINE

## 2021-06-26 PROCEDURE — 99223 1ST HOSP IP/OBS HIGH 75: CPT | Performed by: PSYCHIATRY & NEUROLOGY

## 2021-06-26 PROCEDURE — 82077 ASSAY SPEC XCP UR&BREATH IA: CPT | Performed by: STUDENT IN AN ORGANIZED HEALTH CARE EDUCATION/TRAINING PROGRAM

## 2021-06-26 PROCEDURE — 93005 ELECTROCARDIOGRAM TRACING: CPT | Performed by: PSYCHIATRY & NEUROLOGY

## 2021-06-26 RX ORDER — LORAZEPAM 1 MG/1
1 TABLET ORAL EVERY 4 HOURS PRN
Status: ACTIVE | OUTPATIENT
Start: 2021-06-29 | End: 2021-06-30

## 2021-06-26 RX ORDER — IBUPROFEN 400 MG/1
400 TABLET ORAL EVERY 6 HOURS PRN
Status: DISCONTINUED | OUTPATIENT
Start: 2021-06-26 | End: 2021-07-01 | Stop reason: HOSPADM

## 2021-06-26 RX ORDER — MULTIPLE VITAMINS W/ MINERALS TAB 9MG-400MCG
1 TAB ORAL DAILY
Status: DISCONTINUED | OUTPATIENT
Start: 2021-06-26 | End: 2021-07-01 | Stop reason: HOSPADM

## 2021-06-26 RX ORDER — QUETIAPINE FUMARATE 100 MG/1
100 TABLET, FILM COATED ORAL NIGHTLY
Status: DISCONTINUED | OUTPATIENT
Start: 2021-06-26 | End: 2021-07-01 | Stop reason: HOSPADM

## 2021-06-26 RX ORDER — ALBUTEROL SULFATE 2.5 MG/3ML
2.5 SOLUTION RESPIRATORY (INHALATION) EVERY 4 HOURS PRN
Status: DISCONTINUED | OUTPATIENT
Start: 2021-06-26 | End: 2021-06-29

## 2021-06-26 RX ORDER — VARENICLINE TARTRATE 0.5 MG/1
1 TABLET, FILM COATED ORAL 2 TIMES DAILY
Status: DISCONTINUED | OUTPATIENT
Start: 2021-06-26 | End: 2021-07-01 | Stop reason: HOSPADM

## 2021-06-26 RX ORDER — BENZTROPINE MESYLATE 1 MG/ML
1 INJECTION INTRAMUSCULAR; INTRAVENOUS ONCE AS NEEDED
Status: DISCONTINUED | OUTPATIENT
Start: 2021-06-26 | End: 2021-07-01 | Stop reason: HOSPADM

## 2021-06-26 RX ORDER — HYDROXYZINE 50 MG/1
50 TABLET, FILM COATED ORAL EVERY 6 HOURS PRN
Status: DISCONTINUED | OUTPATIENT
Start: 2021-06-26 | End: 2021-07-01 | Stop reason: HOSPADM

## 2021-06-26 RX ORDER — TRAZODONE HYDROCHLORIDE 50 MG/1
50 TABLET ORAL NIGHTLY PRN
Status: DISCONTINUED | OUTPATIENT
Start: 2021-06-26 | End: 2021-07-01 | Stop reason: HOSPADM

## 2021-06-26 RX ORDER — CELECOXIB 100 MG/1
100 CAPSULE ORAL 2 TIMES DAILY
COMMUNITY

## 2021-06-26 RX ORDER — LORAZEPAM 2 MG/1
2 TABLET ORAL
Status: COMPLETED | OUTPATIENT
Start: 2021-06-27 | End: 2021-06-27

## 2021-06-26 RX ORDER — VARENICLINE TARTRATE 1 MG/1
1 TABLET, FILM COATED ORAL 2 TIMES DAILY
COMMUNITY

## 2021-06-26 RX ORDER — LORAZEPAM 1 MG/1
1 TABLET ORAL
Status: COMPLETED | OUTPATIENT
Start: 2021-06-29 | End: 2021-06-29

## 2021-06-26 RX ORDER — CELECOXIB 100 MG/1
100 CAPSULE ORAL 2 TIMES DAILY
Status: DISCONTINUED | OUTPATIENT
Start: 2021-06-26 | End: 2021-07-01 | Stop reason: HOSPADM

## 2021-06-26 RX ORDER — LORAZEPAM 0.5 MG/1
0.5 TABLET ORAL EVERY 4 HOURS PRN
Status: ACTIVE | OUTPATIENT
Start: 2021-06-30 | End: 2021-07-01

## 2021-06-26 RX ORDER — PANTOPRAZOLE SODIUM 40 MG/1
40 TABLET, DELAYED RELEASE ORAL DAILY
Status: DISCONTINUED | OUTPATIENT
Start: 2021-06-27 | End: 2021-07-01 | Stop reason: HOSPADM

## 2021-06-26 RX ORDER — NITROGLYCERIN 0.4 MG/1
0.4 TABLET SUBLINGUAL
Status: DISCONTINUED | OUTPATIENT
Start: 2021-06-26 | End: 2021-07-01 | Stop reason: HOSPADM

## 2021-06-26 RX ORDER — BENZONATATE 100 MG/1
100 CAPSULE ORAL 3 TIMES DAILY PRN
Status: DISCONTINUED | OUTPATIENT
Start: 2021-06-26 | End: 2021-07-01 | Stop reason: HOSPADM

## 2021-06-26 RX ORDER — LORAZEPAM 2 MG/1
2 TABLET ORAL EVERY 4 HOURS PRN
Status: DISPENSED | OUTPATIENT
Start: 2021-06-27 | End: 2021-06-28

## 2021-06-26 RX ORDER — ALBUTEROL SULFATE 90 UG/1
2 AEROSOL, METERED RESPIRATORY (INHALATION) EVERY 4 HOURS PRN
COMMUNITY

## 2021-06-26 RX ORDER — TRAZODONE HYDROCHLORIDE 50 MG/1
50 TABLET ORAL NIGHTLY
COMMUNITY

## 2021-06-26 RX ORDER — NITROGLYCERIN 0.4 MG/1
0.4 TABLET SUBLINGUAL
COMMUNITY

## 2021-06-26 RX ORDER — ECHINACEA PURPUREA EXTRACT 125 MG
2 TABLET ORAL AS NEEDED
Status: DISCONTINUED | OUTPATIENT
Start: 2021-06-26 | End: 2021-07-01 | Stop reason: HOSPADM

## 2021-06-26 RX ORDER — LORAZEPAM 0.5 MG/1
0.5 TABLET ORAL
Status: COMPLETED | OUTPATIENT
Start: 2021-06-30 | End: 2021-06-30

## 2021-06-26 RX ORDER — PANTOPRAZOLE SODIUM 20 MG/1
20 TABLET, DELAYED RELEASE ORAL DAILY
COMMUNITY

## 2021-06-26 RX ORDER — BENZTROPINE MESYLATE 1 MG/1
2 TABLET ORAL ONCE AS NEEDED
Status: DISCONTINUED | OUTPATIENT
Start: 2021-06-26 | End: 2021-07-01 | Stop reason: HOSPADM

## 2021-06-26 RX ORDER — BUDESONIDE AND FORMOTEROL FUMARATE DIHYDRATE 80; 4.5 UG/1; UG/1
2 AEROSOL RESPIRATORY (INHALATION)
Status: DISCONTINUED | OUTPATIENT
Start: 2021-06-26 | End: 2021-07-01 | Stop reason: HOSPADM

## 2021-06-26 RX ORDER — FAMOTIDINE 20 MG/1
20 TABLET, FILM COATED ORAL 2 TIMES DAILY PRN
Status: DISCONTINUED | OUTPATIENT
Start: 2021-06-26 | End: 2021-07-01 | Stop reason: HOSPADM

## 2021-06-26 RX ORDER — ACETAMINOPHEN 325 MG/1
650 TABLET ORAL EVERY 6 HOURS PRN
Status: DISCONTINUED | OUTPATIENT
Start: 2021-06-26 | End: 2021-07-01 | Stop reason: HOSPADM

## 2021-06-26 RX ORDER — ALUMINA, MAGNESIA, AND SIMETHICONE 2400; 2400; 240 MG/30ML; MG/30ML; MG/30ML
15 SUSPENSION ORAL EVERY 6 HOURS PRN
Status: DISCONTINUED | OUTPATIENT
Start: 2021-06-26 | End: 2021-07-01 | Stop reason: HOSPADM

## 2021-06-26 RX ORDER — CYCLOBENZAPRINE HCL 10 MG
10 TABLET ORAL 3 TIMES DAILY
COMMUNITY

## 2021-06-26 RX ORDER — TIOTROPIUM BROMIDE INHALATION SPRAY 3.12 UG/1
2 SPRAY, METERED RESPIRATORY (INHALATION)
COMMUNITY

## 2021-06-26 RX ORDER — TRAZODONE HYDROCHLORIDE 50 MG/1
50 TABLET ORAL NIGHTLY
Status: DISCONTINUED | OUTPATIENT
Start: 2021-06-26 | End: 2021-07-01 | Stop reason: HOSPADM

## 2021-06-26 RX ORDER — CYCLOBENZAPRINE HCL 10 MG
10 TABLET ORAL 3 TIMES DAILY
Status: DISCONTINUED | OUTPATIENT
Start: 2021-06-26 | End: 2021-07-01 | Stop reason: HOSPADM

## 2021-06-26 RX ORDER — LOPERAMIDE HYDROCHLORIDE 2 MG/1
2 CAPSULE ORAL
Status: DISCONTINUED | OUTPATIENT
Start: 2021-06-26 | End: 2021-07-01 | Stop reason: HOSPADM

## 2021-06-26 RX ORDER — ONDANSETRON 4 MG/1
4 TABLET, FILM COATED ORAL EVERY 6 HOURS PRN
Status: DISCONTINUED | OUTPATIENT
Start: 2021-06-26 | End: 2021-07-01 | Stop reason: HOSPADM

## 2021-06-26 RX ORDER — LORAZEPAM 2 MG/1
2 TABLET ORAL
Status: DISPENSED | OUTPATIENT
Start: 2021-06-26 | End: 2021-06-27

## 2021-06-26 RX ADMIN — ACETAMINOPHEN 650 MG: 325 TABLET ORAL at 09:23

## 2021-06-26 RX ADMIN — ACETAMINOPHEN 650 MG: 325 TABLET ORAL at 18:17

## 2021-06-26 RX ADMIN — BUDESONIDE AND FORMOTEROL FUMARATE DIHYDRATE 2 PUFF: 80; 4.5 AEROSOL RESPIRATORY (INHALATION) at 21:58

## 2021-06-26 RX ADMIN — QUETIAPINE FUMARATE 100 MG: 100 TABLET ORAL at 21:20

## 2021-06-26 RX ADMIN — CELECOXIB 100 MG: 100 CAPSULE ORAL at 21:20

## 2021-06-26 RX ADMIN — TRAZODONE HYDROCHLORIDE 50 MG: 50 TABLET ORAL at 21:20

## 2021-06-26 RX ADMIN — CYCLOBENZAPRINE HYDROCHLORIDE 10 MG: 10 TABLET, FILM COATED ORAL at 21:20

## 2021-06-26 RX ADMIN — LORAZEPAM 2 MG: 2 TABLET ORAL at 09:58

## 2021-06-26 RX ADMIN — VARENICLINE TARTRATE 1 MG: 0.5 TABLET, FILM COATED ORAL at 21:20

## 2021-06-26 RX ADMIN — Medication 100 MG: at 09:23

## 2021-06-26 RX ADMIN — LORAZEPAM 2 MG: 2 TABLET ORAL at 21:20

## 2021-06-26 RX ADMIN — LORAZEPAM 2 MG: 2 TABLET ORAL at 18:17

## 2021-06-26 RX ADMIN — Medication 1 TABLET: at 09:24

## 2021-06-27 PROCEDURE — 99232 SBSQ HOSP IP/OBS MODERATE 35: CPT | Performed by: PSYCHIATRY & NEUROLOGY

## 2021-06-27 PROCEDURE — 94799 UNLISTED PULMONARY SVC/PX: CPT

## 2021-06-27 RX ADMIN — TRAZODONE HYDROCHLORIDE 50 MG: 50 TABLET ORAL at 21:09

## 2021-06-27 RX ADMIN — QUETIAPINE FUMARATE 100 MG: 100 TABLET ORAL at 21:09

## 2021-06-27 RX ADMIN — LORAZEPAM 2 MG: 2 TABLET ORAL at 21:09

## 2021-06-27 RX ADMIN — VARENICLINE TARTRATE 1 MG: 0.5 TABLET, FILM COATED ORAL at 21:09

## 2021-06-27 RX ADMIN — Medication 1 TABLET: at 08:24

## 2021-06-27 RX ADMIN — CYCLOBENZAPRINE HYDROCHLORIDE 10 MG: 10 TABLET, FILM COATED ORAL at 17:07

## 2021-06-27 RX ADMIN — LORAZEPAM 2 MG: 2 TABLET ORAL at 00:55

## 2021-06-27 RX ADMIN — BUDESONIDE AND FORMOTEROL FUMARATE DIHYDRATE 2 PUFF: 80; 4.5 AEROSOL RESPIRATORY (INHALATION) at 11:40

## 2021-06-27 RX ADMIN — Medication 100 MG: at 08:24

## 2021-06-27 RX ADMIN — CYCLOBENZAPRINE HYDROCHLORIDE 10 MG: 10 TABLET, FILM COATED ORAL at 08:24

## 2021-06-27 RX ADMIN — CELECOXIB 100 MG: 100 CAPSULE ORAL at 21:09

## 2021-06-27 RX ADMIN — LORAZEPAM 2 MG: 2 TABLET ORAL at 08:25

## 2021-06-27 RX ADMIN — BUDESONIDE AND FORMOTEROL FUMARATE DIHYDRATE 2 PUFF: 80; 4.5 AEROSOL RESPIRATORY (INHALATION) at 21:09

## 2021-06-27 RX ADMIN — CYCLOBENZAPRINE HYDROCHLORIDE 10 MG: 10 TABLET, FILM COATED ORAL at 21:09

## 2021-06-27 RX ADMIN — CELECOXIB 100 MG: 100 CAPSULE ORAL at 08:27

## 2021-06-27 RX ADMIN — HYDROXYZINE HYDROCHLORIDE 50 MG: 50 TABLET ORAL at 11:39

## 2021-06-27 RX ADMIN — LORAZEPAM 2 MG: 2 TABLET ORAL at 14:02

## 2021-06-27 RX ADMIN — PANTOPRAZOLE SODIUM 40 MG: 40 TABLET, DELAYED RELEASE ORAL at 08:24

## 2021-06-28 PROCEDURE — 94799 UNLISTED PULMONARY SVC/PX: CPT

## 2021-06-28 PROCEDURE — U0004 COV-19 TEST NON-CDC HGH THRU: HCPCS | Performed by: PSYCHIATRY & NEUROLOGY

## 2021-06-28 PROCEDURE — 99232 SBSQ HOSP IP/OBS MODERATE 35: CPT | Performed by: PSYCHIATRY & NEUROLOGY

## 2021-06-28 RX ADMIN — Medication 100 MG: at 08:43

## 2021-06-28 RX ADMIN — TUBERCULIN PURIFIED PROTEIN DERIVATIVE 5 UNITS: 5 INJECTION, SOLUTION INTRADERMAL at 17:51

## 2021-06-28 RX ADMIN — LORAZEPAM 1.5 MG: 0.5 TABLET ORAL at 21:09

## 2021-06-28 RX ADMIN — CYCLOBENZAPRINE HYDROCHLORIDE 10 MG: 10 TABLET, FILM COATED ORAL at 08:42

## 2021-06-28 RX ADMIN — LORAZEPAM 1.5 MG: 0.5 TABLET ORAL at 14:15

## 2021-06-28 RX ADMIN — QUETIAPINE FUMARATE 100 MG: 100 TABLET ORAL at 21:09

## 2021-06-28 RX ADMIN — TRAZODONE HYDROCHLORIDE 50 MG: 50 TABLET ORAL at 01:00

## 2021-06-28 RX ADMIN — LORAZEPAM 2 MG: 2 TABLET ORAL at 01:00

## 2021-06-28 RX ADMIN — IBUPROFEN 400 MG: 400 TABLET, FILM COATED ORAL at 19:04

## 2021-06-28 RX ADMIN — CYCLOBENZAPRINE HYDROCHLORIDE 10 MG: 10 TABLET, FILM COATED ORAL at 15:33

## 2021-06-28 RX ADMIN — TRAZODONE HYDROCHLORIDE 50 MG: 50 TABLET ORAL at 21:09

## 2021-06-28 RX ADMIN — LORAZEPAM 1.5 MG: 0.5 TABLET ORAL at 08:42

## 2021-06-28 RX ADMIN — PANTOPRAZOLE SODIUM 40 MG: 40 TABLET, DELAYED RELEASE ORAL at 08:43

## 2021-06-28 RX ADMIN — Medication 1 TABLET: at 08:43

## 2021-06-28 RX ADMIN — CYCLOBENZAPRINE HYDROCHLORIDE 10 MG: 10 TABLET, FILM COATED ORAL at 21:09

## 2021-06-29 LAB — SARS-COV-2 RNA NOSE QL NAA+PROBE: NOT DETECTED

## 2021-06-29 PROCEDURE — 99232 SBSQ HOSP IP/OBS MODERATE 35: CPT | Performed by: PSYCHIATRY & NEUROLOGY

## 2021-06-29 PROCEDURE — 94799 UNLISTED PULMONARY SVC/PX: CPT

## 2021-06-29 RX ADMIN — BUDESONIDE AND FORMOTEROL FUMARATE DIHYDRATE 2 PUFF: 80; 4.5 AEROSOL RESPIRATORY (INHALATION) at 08:40

## 2021-06-29 RX ADMIN — LORAZEPAM 1 MG: 1 TABLET ORAL at 21:27

## 2021-06-29 RX ADMIN — PANTOPRAZOLE SODIUM 40 MG: 40 TABLET, DELAYED RELEASE ORAL at 08:40

## 2021-06-29 RX ADMIN — CYCLOBENZAPRINE HYDROCHLORIDE 10 MG: 10 TABLET, FILM COATED ORAL at 16:54

## 2021-06-29 RX ADMIN — CYCLOBENZAPRINE HYDROCHLORIDE 10 MG: 10 TABLET, FILM COATED ORAL at 21:27

## 2021-06-29 RX ADMIN — Medication 100 MG: at 08:40

## 2021-06-29 RX ADMIN — HYDROXYZINE HYDROCHLORIDE 50 MG: 50 TABLET ORAL at 08:40

## 2021-06-29 RX ADMIN — Medication 1 TABLET: at 08:40

## 2021-06-29 RX ADMIN — QUETIAPINE FUMARATE 100 MG: 100 TABLET ORAL at 21:27

## 2021-06-29 RX ADMIN — CELECOXIB 100 MG: 100 CAPSULE ORAL at 21:27

## 2021-06-29 RX ADMIN — LORAZEPAM 1 MG: 1 TABLET ORAL at 08:40

## 2021-06-29 RX ADMIN — LORAZEPAM 1 MG: 1 TABLET ORAL at 14:26

## 2021-06-29 RX ADMIN — TRAZODONE HYDROCHLORIDE 50 MG: 50 TABLET ORAL at 21:27

## 2021-06-29 RX ADMIN — CYCLOBENZAPRINE HYDROCHLORIDE 10 MG: 10 TABLET, FILM COATED ORAL at 08:40

## 2021-06-30 PROCEDURE — 99232 SBSQ HOSP IP/OBS MODERATE 35: CPT | Performed by: PSYCHIATRY & NEUROLOGY

## 2021-06-30 RX ADMIN — TRAZODONE HYDROCHLORIDE 50 MG: 50 TABLET ORAL at 21:13

## 2021-06-30 RX ADMIN — Medication 1 TABLET: at 08:59

## 2021-06-30 RX ADMIN — QUETIAPINE FUMARATE 100 MG: 100 TABLET ORAL at 21:14

## 2021-06-30 RX ADMIN — VARENICLINE TARTRATE 1 MG: 0.5 TABLET, FILM COATED ORAL at 21:14

## 2021-06-30 RX ADMIN — LORAZEPAM 0.5 MG: 0.5 TABLET ORAL at 21:14

## 2021-06-30 RX ADMIN — Medication 100 MG: at 09:00

## 2021-06-30 RX ADMIN — BUDESONIDE AND FORMOTEROL FUMARATE DIHYDRATE 2 PUFF: 80; 4.5 AEROSOL RESPIRATORY (INHALATION) at 09:00

## 2021-06-30 RX ADMIN — LORAZEPAM 0.5 MG: 0.5 TABLET ORAL at 14:43

## 2021-06-30 RX ADMIN — LORAZEPAM 0.5 MG: 0.5 TABLET ORAL at 09:00

## 2021-06-30 RX ADMIN — CYCLOBENZAPRINE HYDROCHLORIDE 10 MG: 10 TABLET, FILM COATED ORAL at 08:59

## 2021-06-30 RX ADMIN — IBUPROFEN 400 MG: 400 TABLET, FILM COATED ORAL at 14:43

## 2021-06-30 RX ADMIN — PANTOPRAZOLE SODIUM 40 MG: 40 TABLET, DELAYED RELEASE ORAL at 09:00

## 2021-06-30 RX ADMIN — CYCLOBENZAPRINE HYDROCHLORIDE 10 MG: 10 TABLET, FILM COATED ORAL at 16:50

## 2021-06-30 RX ADMIN — CYCLOBENZAPRINE HYDROCHLORIDE 10 MG: 10 TABLET, FILM COATED ORAL at 21:14

## 2021-06-30 RX ADMIN — VARENICLINE TARTRATE 1 MG: 0.5 TABLET, FILM COATED ORAL at 09:00

## 2021-07-01 VITALS
SYSTOLIC BLOOD PRESSURE: 106 MMHG | BODY MASS INDEX: 19.29 KG/M2 | DIASTOLIC BLOOD PRESSURE: 77 MMHG | WEIGHT: 142.4 LBS | HEART RATE: 98 BPM | HEIGHT: 72 IN | OXYGEN SATURATION: 94 % | RESPIRATION RATE: 18 BRPM | TEMPERATURE: 96.5 F

## 2021-07-01 PROCEDURE — 99238 HOSP IP/OBS DSCHRG MGMT 30/<: CPT | Performed by: PSYCHIATRY & NEUROLOGY

## 2021-07-01 RX ADMIN — CELECOXIB 100 MG: 100 CAPSULE ORAL at 09:52

## 2021-07-01 RX ADMIN — PANTOPRAZOLE SODIUM 40 MG: 40 TABLET, DELAYED RELEASE ORAL at 09:52

## 2021-07-01 RX ADMIN — CYCLOBENZAPRINE HYDROCHLORIDE 10 MG: 10 TABLET, FILM COATED ORAL at 09:53

## 2021-07-01 RX ADMIN — Medication 1 TABLET: at 09:52

## 2021-07-01 RX ADMIN — BUDESONIDE AND FORMOTEROL FUMARATE DIHYDRATE 2 PUFF: 80; 4.5 AEROSOL RESPIRATORY (INHALATION) at 09:52

## 2021-07-01 RX ADMIN — Medication 100 MG: at 09:52

## 2021-08-04 ENCOUNTER — HOSPITAL ENCOUNTER (OUTPATIENT)
Dept: CT IMAGING | Facility: HOSPITAL | Age: 61
Discharge: HOME | End: 2021-08-04
Admitting: INTERNAL MEDICINE

## 2021-08-04 DIAGNOSIS — R91.1 COIN LESION: ICD-10-CM

## 2021-08-04 PROCEDURE — 71250 CT THORAX DX C-: CPT

## 2021-08-04 PROCEDURE — 71250 CT THORAX DX C-: CPT | Performed by: RADIOLOGY

## 2022-11-07 ENCOUNTER — HOSPITAL ENCOUNTER (OUTPATIENT)
Dept: GENERAL RADIOLOGY | Facility: HOSPITAL | Age: 62
Discharge: HOME | End: 2022-11-07
Admitting: FAMILY MEDICINE

## 2022-11-07 ENCOUNTER — TRANSCRIBE ORDERS (OUTPATIENT)
Dept: LAB | Facility: HOSPITAL | Age: 62
End: 2022-11-07

## 2022-11-07 DIAGNOSIS — J20.9 ACUTE BRONCHITIS, UNSPECIFIED ORGANISM: Primary | ICD-10-CM

## 2022-11-07 DIAGNOSIS — J20.9 ACUTE BRONCHITIS, UNSPECIFIED ORGANISM: ICD-10-CM

## 2022-11-07 PROCEDURE — 71046 X-RAY EXAM CHEST 2 VIEWS: CPT | Performed by: RADIOLOGY

## 2022-11-07 PROCEDURE — 71046 X-RAY EXAM CHEST 2 VIEWS: CPT

## 2022-12-15 ENCOUNTER — TRANSCRIBE ORDERS (OUTPATIENT)
Dept: ADMINISTRATIVE | Facility: HOSPITAL | Age: 62
End: 2022-12-15

## 2022-12-15 DIAGNOSIS — R91.1 PULMONARY NODULE, LEFT: Primary | ICD-10-CM

## 2022-12-22 ENCOUNTER — TRANSCRIBE ORDERS (OUTPATIENT)
Dept: ADMINISTRATIVE | Facility: HOSPITAL | Age: 62
End: 2022-12-22

## 2022-12-22 DIAGNOSIS — F17.210 CIGARETTE SMOKER: Primary | ICD-10-CM

## 2022-12-23 ENCOUNTER — APPOINTMENT (OUTPATIENT)
Dept: CT IMAGING | Facility: HOSPITAL | Age: 62
End: 2022-12-23

## 2022-12-27 ENCOUNTER — APPOINTMENT (OUTPATIENT)
Dept: CT IMAGING | Facility: HOSPITAL | Age: 62
End: 2022-12-27

## 2022-12-30 ENCOUNTER — HOSPITAL ENCOUNTER (OUTPATIENT)
Dept: CT IMAGING | Facility: HOSPITAL | Age: 62
Discharge: HOME | End: 2022-12-30
Admitting: NURSE PRACTITIONER

## 2022-12-30 DIAGNOSIS — F17.210 CIGARETTE SMOKER: ICD-10-CM

## 2022-12-30 PROCEDURE — 71271 CT THORAX LUNG CANCER SCR C-: CPT

## 2022-12-30 PROCEDURE — 71271 CT THORAX LUNG CANCER SCR C-: CPT | Performed by: RADIOLOGY

## 2023-01-11 ENCOUNTER — TRANSCRIBE ORDERS (OUTPATIENT)
Dept: LAB | Facility: HOSPITAL | Age: 63
End: 2023-01-11
Payer: MEDICARE

## 2023-01-11 ENCOUNTER — HOSPITAL ENCOUNTER (OUTPATIENT)
Dept: GENERAL RADIOLOGY | Facility: HOSPITAL | Age: 63
Discharge: HOME | End: 2023-01-11
Admitting: FAMILY MEDICINE
Payer: MEDICARE

## 2023-01-11 DIAGNOSIS — M54.2 CERVICALGIA: ICD-10-CM

## 2023-01-11 DIAGNOSIS — M54.2 CERVICALGIA: Primary | ICD-10-CM

## 2023-01-11 PROCEDURE — 72050 X-RAY EXAM NECK SPINE 4/5VWS: CPT

## 2023-01-11 PROCEDURE — 72050 X-RAY EXAM NECK SPINE 4/5VWS: CPT | Performed by: RADIOLOGY

## 2023-11-13 ENCOUNTER — TRANSCRIBE ORDERS (OUTPATIENT)
Dept: ADMINISTRATIVE | Facility: HOSPITAL | Age: 63
End: 2023-11-13
Payer: MEDICARE

## 2023-11-13 DIAGNOSIS — F17.210 CIGARETTE SMOKER: Primary | ICD-10-CM

## 2024-04-08 ENCOUNTER — HOSPITAL ENCOUNTER (EMERGENCY)
Facility: HOSPITAL | Age: 64
Discharge: PSYCHIATRIC OR SUBSTANCE USE FACILITY | DRG: 897 | End: 2024-04-09
Attending: STUDENT IN AN ORGANIZED HEALTH CARE EDUCATION/TRAINING PROGRAM | Admitting: STUDENT IN AN ORGANIZED HEALTH CARE EDUCATION/TRAINING PROGRAM
Payer: MEDICARE

## 2024-04-08 VITALS
TEMPERATURE: 98.3 F | RESPIRATION RATE: 18 BRPM | DIASTOLIC BLOOD PRESSURE: 90 MMHG | HEART RATE: 92 BPM | OXYGEN SATURATION: 95 % | HEIGHT: 72 IN | BODY MASS INDEX: 19.29 KG/M2 | WEIGHT: 142.42 LBS | SYSTOLIC BLOOD PRESSURE: 152 MMHG

## 2024-04-08 DIAGNOSIS — F10.10 ALCOHOL ABUSE: Primary | ICD-10-CM

## 2024-04-08 LAB
ALBUMIN SERPL-MCNC: 4.7 G/DL (ref 3.5–5.2)
ALBUMIN/GLOB SERPL: 1.7 G/DL
ALP SERPL-CCNC: 66 U/L (ref 39–117)
ALT SERPL W P-5'-P-CCNC: 25 U/L (ref 1–41)
AMPHET+METHAMPHET UR QL: NEGATIVE
AMPHETAMINES UR QL: NEGATIVE
ANION GAP SERPL CALCULATED.3IONS-SCNC: 13.6 MMOL/L (ref 5–15)
AST SERPL-CCNC: 31 U/L (ref 1–40)
BARBITURATES UR QL SCN: NEGATIVE
BASOPHILS # BLD AUTO: 0.05 10*3/MM3 (ref 0–0.2)
BASOPHILS NFR BLD AUTO: 0.9 % (ref 0–1.5)
BENZODIAZ UR QL SCN: NEGATIVE
BILIRUB SERPL-MCNC: 0.2 MG/DL (ref 0–1.2)
BILIRUB UR QL STRIP: NEGATIVE
BUN SERPL-MCNC: 11 MG/DL (ref 8–23)
BUN/CREAT SERPL: 12.5 (ref 7–25)
BUPRENORPHINE SERPL-MCNC: NEGATIVE NG/ML
CALCIUM SPEC-SCNC: 9.3 MG/DL (ref 8.6–10.5)
CANNABINOIDS SERPL QL: NEGATIVE
CHLORIDE SERPL-SCNC: 103 MMOL/L (ref 98–107)
CLARITY UR: CLEAR
CO2 SERPL-SCNC: 23.4 MMOL/L (ref 22–29)
COCAINE UR QL: NEGATIVE
COLOR UR: YELLOW
CREAT SERPL-MCNC: 0.88 MG/DL (ref 0.76–1.27)
DEPRECATED RDW RBC AUTO: 45 FL (ref 37–54)
EGFRCR SERPLBLD CKD-EPI 2021: 94.8 ML/MIN/1.73
EOSINOPHIL # BLD AUTO: 0.06 10*3/MM3 (ref 0–0.4)
EOSINOPHIL NFR BLD AUTO: 1.1 % (ref 0.3–6.2)
ERYTHROCYTE [DISTWIDTH] IN BLOOD BY AUTOMATED COUNT: 12.5 % (ref 12.3–15.4)
ETHANOL BLD-MCNC: 305 MG/DL (ref 0–10)
ETHANOL BLD-MCNC: 60 MG/DL (ref 0–10)
ETHANOL UR QL: 0.06 %
ETHANOL UR QL: 0.3 %
FENTANYL UR-MCNC: NEGATIVE NG/ML
GLOBULIN UR ELPH-MCNC: 2.7 GM/DL
GLUCOSE SERPL-MCNC: 113 MG/DL (ref 65–99)
GLUCOSE UR STRIP-MCNC: NEGATIVE MG/DL
HCT VFR BLD AUTO: 40.3 % (ref 37.5–51)
HGB BLD-MCNC: 13.8 G/DL (ref 13–17.7)
HGB UR QL STRIP.AUTO: NEGATIVE
IMM GRANULOCYTES # BLD AUTO: 0.02 10*3/MM3 (ref 0–0.05)
IMM GRANULOCYTES NFR BLD AUTO: 0.4 % (ref 0–0.5)
KETONES UR QL STRIP: NEGATIVE
LEUKOCYTE ESTERASE UR QL STRIP.AUTO: NEGATIVE
LYMPHOCYTES # BLD AUTO: 2.56 10*3/MM3 (ref 0.7–3.1)
LYMPHOCYTES NFR BLD AUTO: 46.7 % (ref 19.6–45.3)
MAGNESIUM SERPL-MCNC: 2.1 MG/DL (ref 1.6–2.4)
MCH RBC QN AUTO: 33.8 PG (ref 26.6–33)
MCHC RBC AUTO-ENTMCNC: 34.2 G/DL (ref 31.5–35.7)
MCV RBC AUTO: 98.8 FL (ref 79–97)
METHADONE UR QL SCN: NEGATIVE
MONOCYTES # BLD AUTO: 0.48 10*3/MM3 (ref 0.1–0.9)
MONOCYTES NFR BLD AUTO: 8.8 % (ref 5–12)
NEUTROPHILS NFR BLD AUTO: 2.31 10*3/MM3 (ref 1.7–7)
NEUTROPHILS NFR BLD AUTO: 42.1 % (ref 42.7–76)
NITRITE UR QL STRIP: NEGATIVE
NRBC BLD AUTO-RTO: 0 /100 WBC (ref 0–0.2)
OPIATES UR QL: NEGATIVE
OXYCODONE UR QL SCN: NEGATIVE
PCP UR QL SCN: NEGATIVE
PH UR STRIP.AUTO: 6 [PH] (ref 5–8)
PLATELET # BLD AUTO: 260 10*3/MM3 (ref 140–450)
PMV BLD AUTO: 9.5 FL (ref 6–12)
POTASSIUM SERPL-SCNC: 4.4 MMOL/L (ref 3.5–5.2)
PROT SERPL-MCNC: 7.4 G/DL (ref 6–8.5)
PROT UR QL STRIP: NEGATIVE
RBC # BLD AUTO: 4.08 10*6/MM3 (ref 4.14–5.8)
SODIUM SERPL-SCNC: 140 MMOL/L (ref 136–145)
SP GR UR STRIP: 1.01 (ref 1–1.03)
TRICYCLICS UR QL SCN: POSITIVE
UROBILINOGEN UR QL STRIP: NORMAL
WBC NRBC COR # BLD AUTO: 5.48 10*3/MM3 (ref 3.4–10.8)

## 2024-04-08 PROCEDURE — 82077 ASSAY SPEC XCP UR&BREATH IA: CPT | Performed by: PHYSICIAN ASSISTANT

## 2024-04-08 PROCEDURE — 81003 URINALYSIS AUTO W/O SCOPE: CPT | Performed by: PHYSICIAN ASSISTANT

## 2024-04-08 PROCEDURE — 80053 COMPREHEN METABOLIC PANEL: CPT | Performed by: PHYSICIAN ASSISTANT

## 2024-04-08 PROCEDURE — 83735 ASSAY OF MAGNESIUM: CPT | Performed by: PHYSICIAN ASSISTANT

## 2024-04-08 PROCEDURE — 99285 EMERGENCY DEPT VISIT HI MDM: CPT

## 2024-04-08 PROCEDURE — 36415 COLL VENOUS BLD VENIPUNCTURE: CPT

## 2024-04-08 PROCEDURE — 80307 DRUG TEST PRSMV CHEM ANLYZR: CPT | Performed by: PHYSICIAN ASSISTANT

## 2024-04-08 PROCEDURE — 85025 COMPLETE CBC W/AUTO DIFF WBC: CPT | Performed by: PHYSICIAN ASSISTANT

## 2024-04-08 RX ORDER — ACETAMINOPHEN 500 MG
1000 TABLET ORAL ONCE
Status: COMPLETED | OUTPATIENT
Start: 2024-04-08 | End: 2024-04-08

## 2024-04-08 RX ORDER — TRAMADOL HYDROCHLORIDE 50 MG/1
50 TABLET ORAL ONCE
Status: COMPLETED | OUTPATIENT
Start: 2024-04-08 | End: 2024-04-08

## 2024-04-08 RX ADMIN — TRAMADOL HYDROCHLORIDE 50 MG: 50 TABLET, COATED ORAL at 19:04

## 2024-04-08 RX ADMIN — ACETAMINOPHEN 1000 MG: 500 TABLET ORAL at 17:43

## 2024-04-09 ENCOUNTER — HOSPITAL ENCOUNTER (INPATIENT)
Facility: HOSPITAL | Age: 64
LOS: 4 days | Discharge: HOME | DRG: 897 | End: 2024-04-13
Attending: PSYCHIATRY & NEUROLOGY | Admitting: PSYCHIATRY & NEUROLOGY
Payer: MEDICARE

## 2024-04-09 PROBLEM — F10.10 ALCOHOL ABUSE: Status: ACTIVE | Noted: 2024-04-09

## 2024-04-09 PROBLEM — F10.10 ETOH ABUSE: Status: ACTIVE | Noted: 2024-04-09

## 2024-04-09 PROBLEM — I10 HTN (HYPERTENSION): Status: ACTIVE | Noted: 2024-04-09

## 2024-04-09 PROCEDURE — 99222 1ST HOSP IP/OBS MODERATE 55: CPT | Performed by: PSYCHIATRY & NEUROLOGY

## 2024-04-09 PROCEDURE — 93005 ELECTROCARDIOGRAM TRACING: CPT | Performed by: PSYCHIATRY & NEUROLOGY

## 2024-04-09 PROCEDURE — 93010 ELECTROCARDIOGRAM REPORT: CPT | Performed by: INTERNAL MEDICINE

## 2024-04-09 PROCEDURE — HZ2ZZZZ DETOXIFICATION SERVICES FOR SUBSTANCE ABUSE TREATMENT: ICD-10-PCS | Performed by: PSYCHIATRY & NEUROLOGY

## 2024-04-09 RX ORDER — LORAZEPAM 1 MG/1
1 TABLET ORAL EVERY 4 HOURS PRN
Status: ACTIVE | OUTPATIENT
Start: 2024-04-12 | End: 2024-04-13

## 2024-04-09 RX ORDER — LORAZEPAM 0.5 MG/1
0.5 TABLET ORAL EVERY 4 HOURS PRN
Status: DISCONTINUED | OUTPATIENT
Start: 2024-04-13 | End: 2024-04-13 | Stop reason: HOSPADM

## 2024-04-09 RX ORDER — HYDROXYZINE 50 MG/1
50 TABLET, FILM COATED ORAL EVERY 6 HOURS PRN
Status: DISCONTINUED | OUTPATIENT
Start: 2024-04-09 | End: 2024-04-13 | Stop reason: HOSPADM

## 2024-04-09 RX ORDER — ONDANSETRON 4 MG/1
4 TABLET, ORALLY DISINTEGRATING ORAL EVERY 6 HOURS PRN
Status: DISCONTINUED | OUTPATIENT
Start: 2024-04-09 | End: 2024-04-13 | Stop reason: HOSPADM

## 2024-04-09 RX ORDER — ECHINACEA PURPUREA EXTRACT 125 MG
2 TABLET ORAL AS NEEDED
Status: DISCONTINUED | OUTPATIENT
Start: 2024-04-09 | End: 2024-04-13 | Stop reason: HOSPADM

## 2024-04-09 RX ORDER — LORAZEPAM 0.5 MG/1
0.5 TABLET ORAL
Status: DISCONTINUED | OUTPATIENT
Start: 2024-04-13 | End: 2024-04-13 | Stop reason: HOSPADM

## 2024-04-09 RX ORDER — ISOSORBIDE MONONITRATE 30 MG/1
15 TABLET, EXTENDED RELEASE ORAL DAILY
Status: ON HOLD | COMMUNITY
End: 2024-04-09

## 2024-04-09 RX ORDER — NICOTINE 21 MG/24HR
1 PATCH, TRANSDERMAL 24 HOURS TRANSDERMAL
Status: DISCONTINUED | OUTPATIENT
Start: 2024-04-09 | End: 2024-04-13 | Stop reason: HOSPADM

## 2024-04-09 RX ORDER — TRAZODONE HYDROCHLORIDE 50 MG/1
50 TABLET ORAL NIGHTLY PRN
Status: DISCONTINUED | OUTPATIENT
Start: 2024-04-09 | End: 2024-04-13 | Stop reason: HOSPADM

## 2024-04-09 RX ORDER — LOPERAMIDE HYDROCHLORIDE 2 MG/1
2 CAPSULE ORAL
Status: DISCONTINUED | OUTPATIENT
Start: 2024-04-09 | End: 2024-04-13 | Stop reason: HOSPADM

## 2024-04-09 RX ORDER — DIAZEPAM 5 MG/1
10 TABLET ORAL ONCE
Status: COMPLETED | OUTPATIENT
Start: 2024-04-09 | End: 2024-04-09

## 2024-04-09 RX ORDER — LORAZEPAM 1 MG/1
1 TABLET ORAL
Status: COMPLETED | OUTPATIENT
Start: 2024-04-12 | End: 2024-04-12

## 2024-04-09 RX ORDER — CYCLOBENZAPRINE HCL 10 MG
10 TABLET ORAL DAILY
Status: DISCONTINUED | OUTPATIENT
Start: 2024-04-09 | End: 2024-04-13 | Stop reason: HOSPADM

## 2024-04-09 RX ORDER — LORAZEPAM 2 MG/1
2 TABLET ORAL ONCE
Status: COMPLETED | OUTPATIENT
Start: 2024-04-09 | End: 2024-04-09

## 2024-04-09 RX ORDER — FAMOTIDINE 20 MG/1
20 TABLET, FILM COATED ORAL 2 TIMES DAILY PRN
Status: DISCONTINUED | OUTPATIENT
Start: 2024-04-09 | End: 2024-04-13 | Stop reason: HOSPADM

## 2024-04-09 RX ORDER — IBUPROFEN 400 MG/1
400 TABLET ORAL EVERY 6 HOURS PRN
Status: DISCONTINUED | OUTPATIENT
Start: 2024-04-09 | End: 2024-04-13 | Stop reason: HOSPADM

## 2024-04-09 RX ORDER — LORAZEPAM 2 MG/1
2 TABLET ORAL
Status: COMPLETED | OUTPATIENT
Start: 2024-04-10 | End: 2024-04-10

## 2024-04-09 RX ORDER — BENZONATATE 100 MG/1
100 CAPSULE ORAL 3 TIMES DAILY PRN
Status: DISCONTINUED | OUTPATIENT
Start: 2024-04-09 | End: 2024-04-13 | Stop reason: HOSPADM

## 2024-04-09 RX ORDER — QUETIAPINE FUMARATE 300 MG/1
300 TABLET, FILM COATED ORAL NIGHTLY
COMMUNITY

## 2024-04-09 RX ORDER — MULTIPLE VITAMINS W/ MINERALS TAB 9MG-400MCG
1 TAB ORAL DAILY
Status: DISCONTINUED | OUTPATIENT
Start: 2024-04-09 | End: 2024-04-13 | Stop reason: HOSPADM

## 2024-04-09 RX ORDER — MULTIVITAMIN WITH IRON
2 TABLET ORAL DAILY
Status: DISCONTINUED | OUTPATIENT
Start: 2024-04-09 | End: 2024-04-13 | Stop reason: HOSPADM

## 2024-04-09 RX ORDER — ONDANSETRON 4 MG/1
4 TABLET, FILM COATED ORAL EVERY 8 HOURS PRN
Status: ON HOLD | COMMUNITY
End: 2024-04-09

## 2024-04-09 RX ORDER — QUETIAPINE FUMARATE 100 MG/1
300 TABLET, FILM COATED ORAL NIGHTLY
Status: DISCONTINUED | OUTPATIENT
Start: 2024-04-09 | End: 2024-04-13 | Stop reason: HOSPADM

## 2024-04-09 RX ORDER — LORAZEPAM 2 MG/1
2 TABLET ORAL EVERY 4 HOURS PRN
Status: DISPENSED | OUTPATIENT
Start: 2024-04-10 | End: 2024-04-11

## 2024-04-09 RX ORDER — ALUMINA, MAGNESIA, AND SIMETHICONE 2400; 2400; 240 MG/30ML; MG/30ML; MG/30ML
15 SUSPENSION ORAL EVERY 6 HOURS PRN
Status: DISCONTINUED | OUTPATIENT
Start: 2024-04-09 | End: 2024-04-13 | Stop reason: HOSPADM

## 2024-04-09 RX ORDER — TIZANIDINE 2 MG/1
2 TABLET ORAL 2 TIMES DAILY
Status: ON HOLD | COMMUNITY
End: 2024-04-09

## 2024-04-09 RX ORDER — LORAZEPAM 2 MG/1
2 TABLET ORAL
Status: DISPENSED | OUTPATIENT
Start: 2024-04-09 | End: 2024-04-10

## 2024-04-09 RX ORDER — ACETAMINOPHEN 325 MG/1
650 TABLET ORAL EVERY 6 HOURS PRN
Status: DISCONTINUED | OUTPATIENT
Start: 2024-04-09 | End: 2024-04-13 | Stop reason: HOSPADM

## 2024-04-09 RX ADMIN — CYCLOBENZAPRINE HYDROCHLORIDE 10 MG: 10 TABLET, FILM COATED ORAL at 12:14

## 2024-04-09 RX ADMIN — LORAZEPAM 2 MG: 2 TABLET ORAL at 17:05

## 2024-04-09 RX ADMIN — IBUPROFEN 400 MG: 400 TABLET, FILM COATED ORAL at 03:21

## 2024-04-09 RX ADMIN — HYDROXYZINE HYDROCHLORIDE 50 MG: 50 TABLET ORAL at 08:28

## 2024-04-09 RX ADMIN — LORAZEPAM 2 MG: 2 TABLET ORAL at 14:36

## 2024-04-09 RX ADMIN — METOPROLOL TARTRATE 12.5 MG: 25 TABLET, FILM COATED ORAL at 12:14

## 2024-04-09 RX ADMIN — ACETAMINOPHEN 650 MG: 325 TABLET ORAL at 08:29

## 2024-04-09 RX ADMIN — Medication 2 TABLET: at 12:14

## 2024-04-09 RX ADMIN — Medication 1 TABLET: at 08:28

## 2024-04-09 RX ADMIN — QUETIAPINE FUMARATE 300 MG: 100 TABLET ORAL at 21:35

## 2024-04-09 RX ADMIN — LORAZEPAM 2 MG: 2 TABLET ORAL at 08:28

## 2024-04-09 RX ADMIN — Medication 100 MG: at 08:28

## 2024-04-09 RX ADMIN — LORAZEPAM 2 MG: 2 TABLET ORAL at 12:14

## 2024-04-09 RX ADMIN — LORAZEPAM 2 MG: 2 TABLET ORAL at 03:21

## 2024-04-09 RX ADMIN — DIAZEPAM 10 MG: 5 TABLET ORAL at 19:58

## 2024-04-09 RX ADMIN — LORAZEPAM 2 MG: 2 TABLET ORAL at 18:30

## 2024-04-09 RX ADMIN — DIAZEPAM 10 MG: 5 TABLET ORAL at 21:35

## 2024-04-10 LAB
QT INTERVAL: 382 MS
QTC INTERVAL: 432 MS

## 2024-04-10 PROCEDURE — 99232 SBSQ HOSP IP/OBS MODERATE 35: CPT | Performed by: PSYCHIATRY & NEUROLOGY

## 2024-04-10 RX ADMIN — METOPROLOL TARTRATE 12.5 MG: 25 TABLET, FILM COATED ORAL at 21:30

## 2024-04-10 RX ADMIN — MAGNESIUM HYDROXIDE 10 ML: 2400 SUSPENSION ORAL at 18:03

## 2024-04-10 RX ADMIN — CYCLOBENZAPRINE HYDROCHLORIDE 10 MG: 10 TABLET, FILM COATED ORAL at 08:50

## 2024-04-10 RX ADMIN — Medication 1 TABLET: at 08:49

## 2024-04-10 RX ADMIN — Medication 2 TABLET: at 08:50

## 2024-04-10 RX ADMIN — LORAZEPAM 2 MG: 2 TABLET ORAL at 21:29

## 2024-04-10 RX ADMIN — HYDROXYZINE HYDROCHLORIDE 50 MG: 50 TABLET ORAL at 18:31

## 2024-04-10 RX ADMIN — METOPROLOL TARTRATE 12.5 MG: 25 TABLET, FILM COATED ORAL at 08:49

## 2024-04-10 RX ADMIN — TRAZODONE HYDROCHLORIDE 50 MG: 50 TABLET ORAL at 21:29

## 2024-04-10 RX ADMIN — Medication 100 MG: at 08:50

## 2024-04-10 RX ADMIN — LORAZEPAM 2 MG: 2 TABLET ORAL at 15:41

## 2024-04-10 RX ADMIN — QUETIAPINE FUMARATE 300 MG: 100 TABLET ORAL at 21:30

## 2024-04-10 RX ADMIN — LORAZEPAM 2 MG: 2 TABLET ORAL at 08:48

## 2024-04-11 PROCEDURE — 99232 SBSQ HOSP IP/OBS MODERATE 35: CPT | Performed by: PSYCHIATRY & NEUROLOGY

## 2024-04-11 RX ORDER — POLYETHYLENE GLYCOL 3350 17 G/17G
17 POWDER, FOR SOLUTION ORAL DAILY
Status: DISCONTINUED | OUTPATIENT
Start: 2024-04-11 | End: 2024-04-13 | Stop reason: HOSPADM

## 2024-04-11 RX ADMIN — METOPROLOL TARTRATE 12.5 MG: 25 TABLET, FILM COATED ORAL at 21:14

## 2024-04-11 RX ADMIN — CYCLOBENZAPRINE HYDROCHLORIDE 10 MG: 10 TABLET, FILM COATED ORAL at 08:40

## 2024-04-11 RX ADMIN — Medication 100 MG: at 08:41

## 2024-04-11 RX ADMIN — QUETIAPINE FUMARATE 300 MG: 100 TABLET ORAL at 21:14

## 2024-04-11 RX ADMIN — LORAZEPAM 1.5 MG: 1 TABLET ORAL at 09:04

## 2024-04-11 RX ADMIN — HYDROXYZINE HYDROCHLORIDE 50 MG: 50 TABLET ORAL at 00:33

## 2024-04-11 RX ADMIN — Medication 2 TABLET: at 08:41

## 2024-04-11 RX ADMIN — HYDROXYZINE HYDROCHLORIDE 50 MG: 50 TABLET ORAL at 21:14

## 2024-04-11 RX ADMIN — Medication 1 TABLET: at 08:40

## 2024-04-11 RX ADMIN — LORAZEPAM 1.5 MG: 1 TABLET ORAL at 21:14

## 2024-04-11 RX ADMIN — LORAZEPAM 1.5 MG: 1 TABLET ORAL at 14:17

## 2024-04-11 RX ADMIN — POLYETHYLENE GLYCOL (3350) 17 G: 17 POWDER, FOR SOLUTION ORAL at 14:15

## 2024-04-11 RX ADMIN — TRAZODONE HYDROCHLORIDE 50 MG: 50 TABLET ORAL at 21:14

## 2024-04-11 RX ADMIN — LORAZEPAM 2 MG: 2 TABLET ORAL at 00:33

## 2024-04-11 RX ADMIN — METOPROLOL TARTRATE 12.5 MG: 25 TABLET, FILM COATED ORAL at 08:40

## 2024-04-12 PROCEDURE — 99232 SBSQ HOSP IP/OBS MODERATE 35: CPT | Performed by: PSYCHIATRY & NEUROLOGY

## 2024-04-12 RX ADMIN — METOPROLOL TARTRATE 12.5 MG: 25 TABLET, FILM COATED ORAL at 08:33

## 2024-04-12 RX ADMIN — IBUPROFEN 400 MG: 400 TABLET, FILM COATED ORAL at 21:23

## 2024-04-12 RX ADMIN — IBUPROFEN 400 MG: 400 TABLET, FILM COATED ORAL at 09:03

## 2024-04-12 RX ADMIN — LORAZEPAM 1 MG: 1 TABLET ORAL at 21:23

## 2024-04-12 RX ADMIN — Medication 1 TABLET: at 08:44

## 2024-04-12 RX ADMIN — QUETIAPINE FUMARATE 300 MG: 100 TABLET ORAL at 21:23

## 2024-04-12 RX ADMIN — HYDROXYZINE HYDROCHLORIDE 50 MG: 50 TABLET ORAL at 08:45

## 2024-04-12 RX ADMIN — LORAZEPAM 1 MG: 1 TABLET ORAL at 14:52

## 2024-04-12 RX ADMIN — Medication 100 MG: at 08:45

## 2024-04-12 RX ADMIN — CYCLOBENZAPRINE HYDROCHLORIDE 10 MG: 10 TABLET, FILM COATED ORAL at 09:03

## 2024-04-12 RX ADMIN — TRAZODONE HYDROCHLORIDE 50 MG: 50 TABLET ORAL at 21:23

## 2024-04-12 RX ADMIN — POLYETHYLENE GLYCOL (3350) 17 G: 17 POWDER, FOR SOLUTION ORAL at 09:04

## 2024-04-12 RX ADMIN — Medication 2 TABLET: at 08:44

## 2024-04-12 RX ADMIN — HYDROXYZINE HYDROCHLORIDE 50 MG: 50 TABLET ORAL at 21:23

## 2024-04-12 RX ADMIN — LORAZEPAM 1 MG: 1 TABLET ORAL at 08:35

## 2024-04-12 RX ADMIN — METOPROLOL TARTRATE 12.5 MG: 25 TABLET, FILM COATED ORAL at 21:23

## 2024-04-13 VITALS
OXYGEN SATURATION: 98 % | HEART RATE: 84 BPM | HEIGHT: 72 IN | DIASTOLIC BLOOD PRESSURE: 85 MMHG | WEIGHT: 144.2 LBS | SYSTOLIC BLOOD PRESSURE: 138 MMHG | RESPIRATION RATE: 18 BRPM | BODY MASS INDEX: 19.53 KG/M2 | TEMPERATURE: 98.3 F

## 2024-04-13 PROCEDURE — 99238 HOSP IP/OBS DSCHRG MGMT 30/<: CPT | Performed by: PSYCHIATRY & NEUROLOGY

## 2024-04-13 RX ADMIN — CYCLOBENZAPRINE HYDROCHLORIDE 10 MG: 10 TABLET, FILM COATED ORAL at 08:21

## 2024-04-13 RX ADMIN — LORAZEPAM 0.5 MG: 0.5 TABLET ORAL at 08:21

## 2024-04-13 RX ADMIN — HYDROXYZINE HYDROCHLORIDE 50 MG: 50 TABLET ORAL at 08:21

## 2024-04-13 RX ADMIN — IBUPROFEN 400 MG: 400 TABLET, FILM COATED ORAL at 08:21

## 2024-04-13 RX ADMIN — METOPROLOL TARTRATE 12.5 MG: 25 TABLET, FILM COATED ORAL at 08:21

## 2024-04-13 RX ADMIN — Medication 1 TABLET: at 08:21

## 2024-04-13 RX ADMIN — Medication 100 MG: at 08:21

## 2024-04-13 RX ADMIN — Medication 2 TABLET: at 08:21

## 2025-07-08 ENCOUNTER — HOSPITAL ENCOUNTER (EMERGENCY)
Facility: HOSPITAL | Age: 65
Discharge: PSYCHIATRIC OR SUBSTANCE USE FACILITY | DRG: 897 | End: 2025-07-09
Payer: MEDICARE

## 2025-07-08 DIAGNOSIS — F10.10 ALCOHOL ABUSE: Primary | ICD-10-CM

## 2025-07-08 LAB
ALBUMIN SERPL-MCNC: 4.3 G/DL (ref 3.5–5.2)
ALBUMIN/GLOB SERPL: 1.5 G/DL
ALP SERPL-CCNC: 91 U/L (ref 39–117)
ALT SERPL W P-5'-P-CCNC: 29 U/L (ref 1–41)
AMPHET+METHAMPHET UR QL: NEGATIVE
AMPHETAMINES UR QL: NEGATIVE
ANION GAP SERPL CALCULATED.3IONS-SCNC: 15 MMOL/L (ref 5–15)
AST SERPL-CCNC: 51 U/L (ref 1–40)
BARBITURATES UR QL SCN: NEGATIVE
BASOPHILS # BLD AUTO: 0.04 10*3/MM3 (ref 0–0.2)
BASOPHILS NFR BLD AUTO: 0.8 % (ref 0–1.5)
BENZODIAZ UR QL SCN: NEGATIVE
BILIRUB SERPL-MCNC: 0.7 MG/DL (ref 0–1.2)
BILIRUB UR QL STRIP: NEGATIVE
BUN SERPL-MCNC: 6.2 MG/DL (ref 8–23)
BUN/CREAT SERPL: 7.9 (ref 7–25)
BUPRENORPHINE SERPL-MCNC: NEGATIVE NG/ML
CALCIUM SPEC-SCNC: 8.9 MG/DL (ref 8.6–10.5)
CANNABINOIDS SERPL QL: NEGATIVE
CHLORIDE SERPL-SCNC: 97 MMOL/L (ref 98–107)
CLARITY UR: CLEAR
CO2 SERPL-SCNC: 26 MMOL/L (ref 22–29)
COCAINE UR QL: NEGATIVE
COLOR UR: YELLOW
CREAT SERPL-MCNC: 0.78 MG/DL (ref 0.76–1.27)
DEPRECATED RDW RBC AUTO: 45.8 FL (ref 37–54)
EGFRCR SERPLBLD CKD-EPI 2021: 97.7 ML/MIN/1.73
EOSINOPHIL # BLD AUTO: 0.02 10*3/MM3 (ref 0–0.4)
EOSINOPHIL NFR BLD AUTO: 0.4 % (ref 0.3–6.2)
ERYTHROCYTE [DISTWIDTH] IN BLOOD BY AUTOMATED COUNT: 12.7 % (ref 12.3–15.4)
ETHANOL BLD-MCNC: 324 MG/DL (ref 0–10)
ETHANOL UR QL: 0.32 %
FENTANYL UR-MCNC: NEGATIVE NG/ML
GLOBULIN UR ELPH-MCNC: 2.8 GM/DL
GLUCOSE SERPL-MCNC: 104 MG/DL (ref 65–99)
GLUCOSE UR STRIP-MCNC: NEGATIVE MG/DL
HCT VFR BLD AUTO: 42.8 % (ref 37.5–51)
HGB BLD-MCNC: 14.3 G/DL (ref 13–17.7)
HGB UR QL STRIP.AUTO: NEGATIVE
IMM GRANULOCYTES # BLD AUTO: 0.02 10*3/MM3 (ref 0–0.05)
IMM GRANULOCYTES NFR BLD AUTO: 0.4 % (ref 0–0.5)
KETONES UR QL STRIP: NEGATIVE
LEUKOCYTE ESTERASE UR QL STRIP.AUTO: NEGATIVE
LYMPHOCYTES # BLD AUTO: 2.28 10*3/MM3 (ref 0.7–3.1)
LYMPHOCYTES NFR BLD AUTO: 43.8 % (ref 19.6–45.3)
MAGNESIUM SERPL-MCNC: 2.1 MG/DL (ref 1.6–2.4)
MCH RBC QN AUTO: 32.9 PG (ref 26.6–33)
MCHC RBC AUTO-ENTMCNC: 33.4 G/DL (ref 31.5–35.7)
MCV RBC AUTO: 98.4 FL (ref 79–97)
METHADONE UR QL SCN: NEGATIVE
MONOCYTES # BLD AUTO: 0.35 10*3/MM3 (ref 0.1–0.9)
MONOCYTES NFR BLD AUTO: 6.7 % (ref 5–12)
NEUTROPHILS NFR BLD AUTO: 2.49 10*3/MM3 (ref 1.7–7)
NEUTROPHILS NFR BLD AUTO: 47.9 % (ref 42.7–76)
NITRITE UR QL STRIP: NEGATIVE
NRBC BLD AUTO-RTO: 0 /100 WBC (ref 0–0.2)
OPIATES UR QL: POSITIVE
OXYCODONE UR QL SCN: NEGATIVE
PCP UR QL SCN: NEGATIVE
PH UR STRIP.AUTO: 7.5 [PH] (ref 5–8)
PLATELET # BLD AUTO: 248 10*3/MM3 (ref 140–450)
PMV BLD AUTO: 9.6 FL (ref 6–12)
POTASSIUM SERPL-SCNC: 4 MMOL/L (ref 3.5–5.2)
PROT SERPL-MCNC: 7.1 G/DL (ref 6–8.5)
PROT UR QL STRIP: NEGATIVE
RBC # BLD AUTO: 4.35 10*6/MM3 (ref 4.14–5.8)
SODIUM SERPL-SCNC: 138 MMOL/L (ref 136–145)
SP GR UR STRIP: <=1.005 (ref 1–1.03)
TRICYCLICS UR QL SCN: NEGATIVE
UROBILINOGEN UR QL STRIP: NORMAL
WBC NRBC COR # BLD AUTO: 5.2 10*3/MM3 (ref 3.4–10.8)

## 2025-07-08 PROCEDURE — 36415 COLL VENOUS BLD VENIPUNCTURE: CPT

## 2025-07-08 PROCEDURE — 99285 EMERGENCY DEPT VISIT HI MDM: CPT

## 2025-07-08 PROCEDURE — 25010000002 LORAZEPAM PER 2 MG

## 2025-07-08 PROCEDURE — 96375 TX/PRO/DX INJ NEW DRUG ADDON: CPT

## 2025-07-08 PROCEDURE — 82077 ASSAY SPEC XCP UR&BREATH IA: CPT | Performed by: PHYSICIAN ASSISTANT

## 2025-07-08 PROCEDURE — 25010000002 FOLIC ACID 5 MG/ML SOLUTION 10 ML VIAL: Performed by: PHYSICIAN ASSISTANT

## 2025-07-08 PROCEDURE — 85025 COMPLETE CBC W/AUTO DIFF WBC: CPT | Performed by: PHYSICIAN ASSISTANT

## 2025-07-08 PROCEDURE — 80053 COMPREHEN METABOLIC PANEL: CPT | Performed by: PHYSICIAN ASSISTANT

## 2025-07-08 PROCEDURE — 81003 URINALYSIS AUTO W/O SCOPE: CPT | Performed by: PHYSICIAN ASSISTANT

## 2025-07-08 PROCEDURE — 83735 ASSAY OF MAGNESIUM: CPT | Performed by: PHYSICIAN ASSISTANT

## 2025-07-08 PROCEDURE — 25810000003 SODIUM CHLORIDE 0.9 % SOLUTION: Performed by: PHYSICIAN ASSISTANT

## 2025-07-08 PROCEDURE — 25010000002 THIAMINE PER 100 MG: Performed by: PHYSICIAN ASSISTANT

## 2025-07-08 PROCEDURE — 80307 DRUG TEST PRSMV CHEM ANLYZR: CPT | Performed by: PHYSICIAN ASSISTANT

## 2025-07-08 PROCEDURE — 25010000002 METOCLOPRAMIDE PER 10 MG: Performed by: PHYSICIAN ASSISTANT

## 2025-07-08 PROCEDURE — 96365 THER/PROPH/DIAG IV INF INIT: CPT

## 2025-07-08 PROCEDURE — 25010000002 ONDANSETRON PER 1 MG: Performed by: PHYSICIAN ASSISTANT

## 2025-07-08 RX ORDER — METOCLOPRAMIDE HYDROCHLORIDE 5 MG/ML
10 INJECTION INTRAMUSCULAR; INTRAVENOUS ONCE
Status: COMPLETED | OUTPATIENT
Start: 2025-07-08 | End: 2025-07-08

## 2025-07-08 RX ORDER — ONDANSETRON 2 MG/ML
4 INJECTION INTRAMUSCULAR; INTRAVENOUS ONCE
Status: COMPLETED | OUTPATIENT
Start: 2025-07-08 | End: 2025-07-08

## 2025-07-08 RX ORDER — THIAMINE HYDROCHLORIDE 100 MG/ML
200 INJECTION, SOLUTION INTRAMUSCULAR; INTRAVENOUS ONCE
Status: COMPLETED | OUTPATIENT
Start: 2025-07-08 | End: 2025-07-08

## 2025-07-08 RX ORDER — SODIUM CHLORIDE 0.9 % (FLUSH) 0.9 %
10 SYRINGE (ML) INJECTION AS NEEDED
Status: DISCONTINUED | OUTPATIENT
Start: 2025-07-08 | End: 2025-07-09 | Stop reason: HOSPADM

## 2025-07-08 RX ORDER — LORAZEPAM 2 MG/ML
2 INJECTION INTRAMUSCULAR ONCE
Status: COMPLETED | OUTPATIENT
Start: 2025-07-08 | End: 2025-07-08

## 2025-07-08 RX ORDER — SODIUM CHLORIDE 9 MG/ML
1000 INJECTION, SOLUTION INTRAVENOUS ONCE
Status: COMPLETED | OUTPATIENT
Start: 2025-07-08 | End: 2025-07-08

## 2025-07-08 RX ADMIN — FOLIC ACID 1 MG: 5 INJECTION, SOLUTION INTRAMUSCULAR; INTRAVENOUS; SUBCUTANEOUS at 19:23

## 2025-07-08 RX ADMIN — ONDANSETRON 4 MG: 2 INJECTION, SOLUTION INTRAMUSCULAR; INTRAVENOUS at 18:58

## 2025-07-08 RX ADMIN — SODIUM CHLORIDE 1000 ML: 9 INJECTION, SOLUTION INTRAVENOUS at 18:58

## 2025-07-08 RX ADMIN — THIAMINE HYDROCHLORIDE 200 MG: 100 INJECTION, SOLUTION INTRAMUSCULAR; INTRAVENOUS at 18:59

## 2025-07-08 RX ADMIN — LORAZEPAM 2 MG: 2 INJECTION INTRAMUSCULAR; INTRAVENOUS at 23:09

## 2025-07-08 RX ADMIN — METOCLOPRAMIDE 10 MG: 5 INJECTION, SOLUTION INTRAMUSCULAR; INTRAVENOUS at 21:15

## 2025-07-08 NOTE — ED PROVIDER NOTES
"Subjective   History of Present Illness  This is a 65 year old male patient who presents to the ER with chief complaint of alcohol abuse. PMH significant for alcohol abuse, HTN. The patient presents here today desiring alcohol detox. He drinks 8 ounces of rum daily and drank 4 ounces today PTA. Denies drug abuse, SI and HI. He is prescribed Norco for chronic pain.         Review of Systems   Constitutional: Negative.  Negative for fever.   HENT: Negative.     Respiratory: Negative.     Cardiovascular: Negative.  Negative for chest pain.   Gastrointestinal: Negative.  Negative for abdominal pain.   Endocrine: Negative.    Genitourinary: Negative.  Negative for dysuria.   Skin: Negative.    Neurological: Negative.    Psychiatric/Behavioral: Negative.     All other systems reviewed and are negative.      Past Medical History:   Diagnosis Date    Alcohol abuse     Alcohol withdrawal seizure     \"2012\"    Alcoholism     Anxiety     Back pain     Bone spur     Bone spur of neck    COPD (chronic obstructive pulmonary disease)     DJD (degenerative joint disease)     GERD (gastroesophageal reflux disease)     H/O gastroesophageal reflux (GERD)     HTN (hypertension)     Rotator cuff tear, left 2012    Withdrawal symptoms, alcohol        Allergies   Allergen Reactions    Antihistamines, Diphenhydramine-Type Urinary Retention       Past Surgical History:   Procedure Laterality Date    CERVICAL SPINE SURGERY      c5 c6 c6 c7  - screws and plate    DENTAL PROCEDURE      HEMORRHOIDECTOMY      TONSILLECTOMY         Family History   Problem Relation Age of Onset    Anxiety disorder Mother     Depression Mother     Dementia Maternal Grandfather        Social History     Socioeconomic History    Marital status:     Number of children: 1    Years of education: some college    Highest education level: Some college, no degree   Tobacco Use    Smoking status: Former     Current packs/day: 0.00     Average packs/day: 1.5 " packs/day for 53.0 years (79.5 ttl pk-yrs)     Types: Cigarettes     Start date: 1968     Quit date: 2021     Years since quittin.1    Smokeless tobacco: Former     Types: Chew     Quit date: 1975   Vaping Use    Vaping status: Every Day    Substances: Nicotine    Devices: Disposable   Substance and Sexual Activity    Alcohol use: Yes     Alcohol/week: 6.0 standard drinks of alcohol     Types: 6 Cans of beer per week     Comment: 1/2 pint to 1.5 pint rum daily    Drug use: Yes     Comment: 0.5 to 1.5 pint rum daily    Sexual activity: Defer     Partners: Female           Objective   Physical Exam  Vitals and nursing note reviewed.   Constitutional:       General: He is not in acute distress.     Appearance: He is well-developed. He is not diaphoretic.   HENT:      Head: Normocephalic and atraumatic.      Right Ear: External ear normal.      Left Ear: External ear normal.      Nose: Nose normal.   Eyes:      Conjunctiva/sclera: Conjunctivae normal.      Pupils: Pupils are equal, round, and reactive to light.   Neck:      Vascular: No JVD.      Trachea: No tracheal deviation.   Cardiovascular:      Rate and Rhythm: Normal rate and regular rhythm.      Heart sounds: Normal heart sounds. No murmur heard.  Pulmonary:      Effort: Pulmonary effort is normal. No respiratory distress.      Breath sounds: Normal breath sounds. No wheezing.   Abdominal:      General: Bowel sounds are normal.      Palpations: Abdomen is soft.      Tenderness: There is no abdominal tenderness.   Musculoskeletal:         General: No deformity. Normal range of motion.      Cervical back: Normal range of motion and neck supple.   Skin:     General: Skin is warm and dry.      Coloration: Skin is not pale.      Findings: No erythema or rash.   Neurological:      Mental Status: He is alert and oriented to person, place, and time.      Cranial Nerves: No cranial nerve deficit.   Psychiatric:         Behavior: Behavior normal.          Thought Content: Thought content normal.         Procedures       Results for orders placed or performed during the hospital encounter of 07/09/25   Comprehensive Metabolic Panel    Collection Time: 07/09/25  8:43 AM    Specimen: Blood   Result Value Ref Range    Glucose 100 (H) 65 - 99 mg/dL    BUN 6.4 (L) 8.0 - 23.0 mg/dL    Creatinine 0.81 0.76 - 1.27 mg/dL    Sodium 138 136 - 145 mmol/L    Potassium 4.2 3.5 - 5.2 mmol/L    Chloride 100 98 - 107 mmol/L    CO2 28.7 22.0 - 29.0 mmol/L    Calcium 8.7 8.6 - 10.5 mg/dL    Total Protein 6.0 6.0 - 8.5 g/dL    Albumin 3.9 3.5 - 5.2 g/dL    ALT (SGPT) 28 1 - 41 U/L    AST (SGOT) 49 (H) 1 - 40 U/L    Alkaline Phosphatase 77 39 - 117 U/L    Total Bilirubin 1.3 (H) 0.0 - 1.2 mg/dL    Globulin 2.1 gm/dL    A/G Ratio 1.9 g/dL    BUN/Creatinine Ratio 7.9 7.0 - 25.0    Anion Gap 9.3 5.0 - 15.0 mmol/L    eGFR 96.6 >60.0 mL/min/1.73   Hepatitis Panel, Acute    Collection Time: 07/09/25  8:43 AM    Specimen: Blood   Result Value Ref Range    Hepatitis B Surface Ag Non-Reactive Non-Reactive    Hep A IgM Non-Reactive Non-Reactive    Hep B C IgM Non-Reactive Non-Reactive    Hepatitis C Ab Non-Reactive Non-Reactive          ED Course  ED Course as of 07/09/25 2020 Tue Jul 08, 2025 1953 Signed out to Rosmery Leach PA-C at shift change.  [MM]   2048 Patient reports nausea [AH]   Wed Jul 09, 2025   0339 Medically clear for detox [AH]   0658 ECG demonstrates normal sinus rhythm at rate of 88 bpm.  NY interval is normal as is QRS duration.  QTc is prolonged at 464 ms.  There are no acute ST-T wave changes. [RA]      ED Course User Index  [AH] Rosmery Leach PA  [MM] Minerva Mcfarlane PA  [RA] Lloyd Jaeger MD                                                       Medical Decision Making  65-year-old male presents to the ED today for a detox evaluation.  He was medically cleared for the evaluation.  Psychiatry was consulted and he will be admitted.    Problems Addressed:  Alcohol  abuse: complicated acute illness or injury    Amount and/or Complexity of Data Reviewed  Labs: ordered.  ECG/medicine tests: ordered.    Risk  Prescription drug management.        Final diagnoses:   Alcohol abuse       ED Disposition  ED Disposition       ED Disposition   DC/Transfer to Behavioral Health    Condition   Stable    Comment   --               No follow-up provider specified.       Medication List      No changes were made to your prescriptions during this visit.            Rosmery Leach PA  07/09/25 0508       Rosmery Leach PA  07/09/25 2020

## 2025-07-09 ENCOUNTER — HOSPITAL ENCOUNTER (INPATIENT)
Facility: HOSPITAL | Age: 65
LOS: 5 days | Discharge: HOME | DRG: 897 | End: 2025-07-14
Attending: PSYCHIATRY & NEUROLOGY | Admitting: PSYCHIATRY & NEUROLOGY
Payer: MEDICARE

## 2025-07-09 VITALS
TEMPERATURE: 96.8 F | BODY MASS INDEX: 20.58 KG/M2 | RESPIRATION RATE: 18 BRPM | HEIGHT: 71 IN | DIASTOLIC BLOOD PRESSURE: 95 MMHG | WEIGHT: 147 LBS | SYSTOLIC BLOOD PRESSURE: 155 MMHG | OXYGEN SATURATION: 94 % | HEART RATE: 95 BPM

## 2025-07-09 PROBLEM — F10.20 ALCOHOL DEPENDENCE: Status: ACTIVE | Noted: 2025-07-09

## 2025-07-09 LAB
ALBUMIN SERPL-MCNC: 3.9 G/DL (ref 3.5–5.2)
ALBUMIN/GLOB SERPL: 1.9 G/DL
ALP SERPL-CCNC: 77 U/L (ref 39–117)
ALT SERPL W P-5'-P-CCNC: 28 U/L (ref 1–41)
ANION GAP SERPL CALCULATED.3IONS-SCNC: 9.3 MMOL/L (ref 5–15)
AST SERPL-CCNC: 49 U/L (ref 1–40)
BILIRUB SERPL-MCNC: 1.3 MG/DL (ref 0–1.2)
BUN SERPL-MCNC: 6.4 MG/DL (ref 8–23)
BUN/CREAT SERPL: 7.9 (ref 7–25)
CALCIUM SPEC-SCNC: 8.7 MG/DL (ref 8.6–10.5)
CHLORIDE SERPL-SCNC: 100 MMOL/L (ref 98–107)
CO2 SERPL-SCNC: 28.7 MMOL/L (ref 22–29)
CREAT SERPL-MCNC: 0.81 MG/DL (ref 0.76–1.27)
EGFRCR SERPLBLD CKD-EPI 2021: 96.6 ML/MIN/1.73
ETHANOL BLD-MCNC: 90 MG/DL (ref 0–10)
ETHANOL UR QL: 0.09 %
GLOBULIN UR ELPH-MCNC: 2.1 GM/DL
GLUCOSE SERPL-MCNC: 100 MG/DL (ref 65–99)
HAV IGM SERPL QL IA: NORMAL
HBV CORE IGM SERPL QL IA: NORMAL
HBV SURFACE AG SERPL QL IA: NORMAL
HCV AB SER QL: NORMAL
POTASSIUM SERPL-SCNC: 4.2 MMOL/L (ref 3.5–5.2)
PROT SERPL-MCNC: 6 G/DL (ref 6–8.5)
QT INTERVAL: 384 MS
QTC INTERVAL: 464 MS
SODIUM SERPL-SCNC: 138 MMOL/L (ref 136–145)

## 2025-07-09 PROCEDURE — 93010 ELECTROCARDIOGRAM REPORT: CPT | Performed by: INTERNAL MEDICINE

## 2025-07-09 PROCEDURE — 80053 COMPREHEN METABOLIC PANEL: CPT | Performed by: PSYCHIATRY & NEUROLOGY

## 2025-07-09 PROCEDURE — 80074 ACUTE HEPATITIS PANEL: CPT | Performed by: PSYCHIATRY & NEUROLOGY

## 2025-07-09 PROCEDURE — 82077 ASSAY SPEC XCP UR&BREATH IA: CPT | Performed by: PHYSICIAN ASSISTANT

## 2025-07-09 PROCEDURE — 99223 1ST HOSP IP/OBS HIGH 75: CPT | Performed by: PSYCHIATRY & NEUROLOGY

## 2025-07-09 PROCEDURE — 25010000002 LORAZEPAM PER 2 MG

## 2025-07-09 PROCEDURE — 94640 AIRWAY INHALATION TREATMENT: CPT

## 2025-07-09 PROCEDURE — 93005 ELECTROCARDIOGRAM TRACING: CPT

## 2025-07-09 PROCEDURE — 94761 N-INVAS EAR/PLS OXIMETRY MLT: CPT

## 2025-07-09 PROCEDURE — 94799 UNLISTED PULMONARY SVC/PX: CPT

## 2025-07-09 PROCEDURE — 96376 TX/PRO/DX INJ SAME DRUG ADON: CPT

## 2025-07-09 PROCEDURE — HZ2ZZZZ DETOXIFICATION SERVICES FOR SUBSTANCE ABUSE TREATMENT: ICD-10-PCS | Performed by: PSYCHIATRY & NEUROLOGY

## 2025-07-09 PROCEDURE — 94664 DEMO&/EVAL PT USE INHALER: CPT

## 2025-07-09 RX ORDER — PRIMIDONE 50 MG/1
75 TABLET ORAL NIGHTLY
Status: DISCONTINUED | OUTPATIENT
Start: 2025-07-09 | End: 2025-07-14 | Stop reason: HOSPADM

## 2025-07-09 RX ORDER — LORAZEPAM 1 MG/1
1 TABLET ORAL EVERY 4 HOURS PRN
Status: ACTIVE | OUTPATIENT
Start: 2025-07-12 | End: 2025-07-13

## 2025-07-09 RX ORDER — FAMOTIDINE 20 MG/1
20 TABLET, FILM COATED ORAL 2 TIMES DAILY PRN
Status: DISCONTINUED | OUTPATIENT
Start: 2025-07-09 | End: 2025-07-14 | Stop reason: HOSPADM

## 2025-07-09 RX ORDER — LORAZEPAM 2 MG/1
2 TABLET ORAL
Status: DISPENSED | OUTPATIENT
Start: 2025-07-09 | End: 2025-07-10

## 2025-07-09 RX ORDER — ECHINACEA PURPUREA EXTRACT 125 MG
2 TABLET ORAL
Status: DISCONTINUED | OUTPATIENT
Start: 2025-07-09 | End: 2025-07-14 | Stop reason: HOSPADM

## 2025-07-09 RX ORDER — MULTIVITAMIN WITH IRON
2 TABLET ORAL DAILY
Status: DISCONTINUED | OUTPATIENT
Start: 2025-07-09 | End: 2025-07-14 | Stop reason: HOSPADM

## 2025-07-09 RX ORDER — PROMETHAZINE HYDROCHLORIDE 25 MG/1
25 TABLET ORAL 2 TIMES DAILY PRN
Status: CANCELLED | OUTPATIENT
Start: 2025-07-09

## 2025-07-09 RX ORDER — QUETIAPINE FUMARATE 50 MG/1
150 TABLET, EXTENDED RELEASE ORAL NIGHTLY
Status: DISCONTINUED | OUTPATIENT
Start: 2025-07-09 | End: 2025-07-14 | Stop reason: HOSPADM

## 2025-07-09 RX ORDER — BENZONATATE 100 MG/1
100 CAPSULE ORAL 3 TIMES DAILY PRN
Status: DISCONTINUED | OUTPATIENT
Start: 2025-07-09 | End: 2025-07-14 | Stop reason: HOSPADM

## 2025-07-09 RX ORDER — METOPROLOL TARTRATE 25 MG/1
12.5 TABLET, FILM COATED ORAL DAILY
Status: CANCELLED | OUTPATIENT
Start: 2025-07-09

## 2025-07-09 RX ORDER — TRAZODONE HYDROCHLORIDE 100 MG/1
100 TABLET ORAL NIGHTLY PRN
COMMUNITY

## 2025-07-09 RX ORDER — METHOCARBAMOL 750 MG/1
750 TABLET, FILM COATED ORAL 2 TIMES DAILY PRN
COMMUNITY
End: 2025-07-14 | Stop reason: HOSPADM

## 2025-07-09 RX ORDER — METHOCARBAMOL 750 MG/1
750 TABLET, FILM COATED ORAL 2 TIMES DAILY PRN
Status: CANCELLED | OUTPATIENT
Start: 2025-07-09

## 2025-07-09 RX ORDER — CYCLOBENZAPRINE HCL 10 MG
10 TABLET ORAL 2 TIMES DAILY PRN
COMMUNITY

## 2025-07-09 RX ORDER — ALUMINA, MAGNESIA, AND SIMETHICONE 2400; 2400; 240 MG/30ML; MG/30ML; MG/30ML
15 SUSPENSION ORAL EVERY 6 HOURS PRN
Status: DISCONTINUED | OUTPATIENT
Start: 2025-07-09 | End: 2025-07-14 | Stop reason: HOSPADM

## 2025-07-09 RX ORDER — LORAZEPAM 0.5 MG/1
0.5 TABLET ORAL EVERY 4 HOURS PRN
Status: ACTIVE | OUTPATIENT
Start: 2025-07-13 | End: 2025-07-14

## 2025-07-09 RX ORDER — GABAPENTIN 100 MG/1
200 CAPSULE ORAL 2 TIMES DAILY
Status: CANCELLED | OUTPATIENT
Start: 2025-07-09

## 2025-07-09 RX ORDER — TRAZODONE HYDROCHLORIDE 50 MG/1
100 TABLET ORAL NIGHTLY PRN
Status: CANCELLED | OUTPATIENT
Start: 2025-07-09

## 2025-07-09 RX ORDER — LOPERAMIDE HYDROCHLORIDE 2 MG/1
2 CAPSULE ORAL
Status: DISCONTINUED | OUTPATIENT
Start: 2025-07-09 | End: 2025-07-14 | Stop reason: HOSPADM

## 2025-07-09 RX ORDER — METOPROLOL SUCCINATE 25 MG/1
12.5 TABLET, EXTENDED RELEASE ORAL
Status: DISCONTINUED | OUTPATIENT
Start: 2025-07-09 | End: 2025-07-14 | Stop reason: HOSPADM

## 2025-07-09 RX ORDER — FLUTICASONE FUROATE, UMECLIDINIUM BROMIDE AND VILANTEROL TRIFENATATE 100; 62.5; 25 UG/1; UG/1; UG/1
1 POWDER RESPIRATORY (INHALATION)
COMMUNITY

## 2025-07-09 RX ORDER — QUETIAPINE 150 MG/1
150 TABLET, FILM COATED, EXTENDED RELEASE ORAL NIGHTLY PRN
COMMUNITY

## 2025-07-09 RX ORDER — HYDROCODONE BITARTRATE AND ACETAMINOPHEN 7.5; 325 MG/1; MG/1
1 TABLET ORAL EVERY 12 HOURS PRN
Refills: 0 | Status: DISCONTINUED | OUTPATIENT
Start: 2025-07-09 | End: 2025-07-14 | Stop reason: HOSPADM

## 2025-07-09 RX ORDER — PRIMIDONE 50 MG/1
75 TABLET ORAL NIGHTLY
Status: CANCELLED | OUTPATIENT
Start: 2025-07-09

## 2025-07-09 RX ORDER — METOPROLOL TARTRATE 25 MG/1
12.5 TABLET, FILM COATED ORAL DAILY
COMMUNITY

## 2025-07-09 RX ORDER — HYDROCODONE BITARTRATE AND ACETAMINOPHEN 7.5; 325 MG/1; MG/1
1 TABLET ORAL EVERY 12 HOURS PRN
COMMUNITY

## 2025-07-09 RX ORDER — BUDESONIDE AND FORMOTEROL FUMARATE DIHYDRATE 160; 4.5 UG/1; UG/1
2 AEROSOL RESPIRATORY (INHALATION)
Status: DISCONTINUED | OUTPATIENT
Start: 2025-07-09 | End: 2025-07-14 | Stop reason: HOSPADM

## 2025-07-09 RX ORDER — TRAZODONE HYDROCHLORIDE 50 MG/1
100 TABLET ORAL NIGHTLY PRN
Status: DISCONTINUED | OUTPATIENT
Start: 2025-07-09 | End: 2025-07-14 | Stop reason: HOSPADM

## 2025-07-09 RX ORDER — LORAZEPAM 1 MG/1
1 TABLET ORAL
Status: COMPLETED | OUTPATIENT
Start: 2025-07-12 | End: 2025-07-12

## 2025-07-09 RX ORDER — ONDANSETRON 4 MG/1
4 TABLET, FILM COATED ORAL EVERY 12 HOURS PRN
COMMUNITY

## 2025-07-09 RX ORDER — GABAPENTIN 100 MG/1
200 CAPSULE ORAL 2 TIMES DAILY
COMMUNITY

## 2025-07-09 RX ORDER — BUDESONIDE AND FORMOTEROL FUMARATE DIHYDRATE 160; 4.5 UG/1; UG/1
2 AEROSOL RESPIRATORY (INHALATION)
Status: CANCELLED | OUTPATIENT
Start: 2025-07-09

## 2025-07-09 RX ORDER — QUETIAPINE FUMARATE 50 MG/1
150 TABLET, EXTENDED RELEASE ORAL NIGHTLY
Status: CANCELLED | OUTPATIENT
Start: 2025-07-09

## 2025-07-09 RX ORDER — CYCLOBENZAPRINE HCL 10 MG
10 TABLET ORAL 2 TIMES DAILY PRN
Status: CANCELLED | OUTPATIENT
Start: 2025-07-09

## 2025-07-09 RX ORDER — FAMOTIDINE 20 MG/1
20 TABLET, FILM COATED ORAL DAILY
Status: CANCELLED | OUTPATIENT
Start: 2025-07-09

## 2025-07-09 RX ORDER — TRAZODONE HYDROCHLORIDE 50 MG/1
50 TABLET ORAL NIGHTLY PRN
Status: DISCONTINUED | OUTPATIENT
Start: 2025-07-09 | End: 2025-07-09

## 2025-07-09 RX ORDER — PRIMIDONE 50 MG/1
75 TABLET ORAL NIGHTLY
COMMUNITY

## 2025-07-09 RX ORDER — LORAZEPAM 2 MG/1
2 TABLET ORAL
Status: COMPLETED | OUTPATIENT
Start: 2025-07-10 | End: 2025-07-10

## 2025-07-09 RX ORDER — IBUPROFEN 400 MG/1
400 TABLET, FILM COATED ORAL EVERY 6 HOURS PRN
Status: DISCONTINUED | OUTPATIENT
Start: 2025-07-09 | End: 2025-07-14 | Stop reason: HOSPADM

## 2025-07-09 RX ORDER — LORAZEPAM 0.5 MG/1
0.5 TABLET ORAL
Status: COMPLETED | OUTPATIENT
Start: 2025-07-13 | End: 2025-07-13

## 2025-07-09 RX ORDER — HYDROCODONE BITARTRATE AND ACETAMINOPHEN 7.5; 325 MG/1; MG/1
1 TABLET ORAL EVERY 12 HOURS PRN
Status: CANCELLED | OUTPATIENT
Start: 2025-07-09

## 2025-07-09 RX ORDER — FAMOTIDINE 20 MG/1
20 TABLET, FILM COATED ORAL DAILY
COMMUNITY

## 2025-07-09 RX ORDER — CHLORDIAZEPOXIDE HYDROCHLORIDE 25 MG/1
50 CAPSULE, GELATIN COATED ORAL ONCE
Status: COMPLETED | OUTPATIENT
Start: 2025-07-10 | End: 2025-07-09

## 2025-07-09 RX ORDER — POLYETHYLENE GLYCOL 3350 17 G/17G
17 POWDER, FOR SOLUTION ORAL DAILY PRN
Status: DISCONTINUED | OUTPATIENT
Start: 2025-07-09 | End: 2025-07-14 | Stop reason: HOSPADM

## 2025-07-09 RX ORDER — ONDANSETRON 4 MG/1
4 TABLET, ORALLY DISINTEGRATING ORAL EVERY 12 HOURS PRN
Status: CANCELLED | OUTPATIENT
Start: 2025-07-09

## 2025-07-09 RX ORDER — ONDANSETRON 4 MG/1
4 TABLET, ORALLY DISINTEGRATING ORAL EVERY 6 HOURS PRN
Status: DISCONTINUED | OUTPATIENT
Start: 2025-07-09 | End: 2025-07-14 | Stop reason: HOSPADM

## 2025-07-09 RX ORDER — LORAZEPAM 2 MG/ML
2 INJECTION INTRAMUSCULAR ONCE
Status: COMPLETED | OUTPATIENT
Start: 2025-07-09 | End: 2025-07-09

## 2025-07-09 RX ORDER — PROMETHAZINE HYDROCHLORIDE 25 MG/1
25 TABLET ORAL 2 TIMES DAILY PRN
COMMUNITY
End: 2025-07-14 | Stop reason: HOSPADM

## 2025-07-09 RX ORDER — CYCLOBENZAPRINE HCL 10 MG
10 TABLET ORAL 3 TIMES DAILY PRN
Status: DISCONTINUED | OUTPATIENT
Start: 2025-07-09 | End: 2025-07-14 | Stop reason: HOSPADM

## 2025-07-09 RX ORDER — GABAPENTIN 100 MG/1
200 CAPSULE ORAL 2 TIMES DAILY
Status: DISCONTINUED | OUTPATIENT
Start: 2025-07-09 | End: 2025-07-14 | Stop reason: HOSPADM

## 2025-07-09 RX ORDER — MULTIPLE VITAMINS W/ MINERALS TAB 9MG-400MCG
1 TAB ORAL DAILY
Status: DISCONTINUED | OUTPATIENT
Start: 2025-07-09 | End: 2025-07-14 | Stop reason: HOSPADM

## 2025-07-09 RX ORDER — LORAZEPAM 2 MG/1
2 TABLET ORAL EVERY 4 HOURS PRN
Status: DISPENSED | OUTPATIENT
Start: 2025-07-10 | End: 2025-07-11

## 2025-07-09 RX ADMIN — HYDROCODONE BITARTRATE AND ACETAMINOPHEN 1 TABLET: 7.5; 325 TABLET ORAL at 21:49

## 2025-07-09 RX ADMIN — TIOTROPIUM BROMIDE INHALATION SPRAY 2 PUFF: 3.12 SPRAY, METERED RESPIRATORY (INHALATION) at 10:38

## 2025-07-09 RX ADMIN — LORAZEPAM 2 MG: 2 INJECTION INTRAMUSCULAR; INTRAVENOUS at 04:05

## 2025-07-09 RX ADMIN — BUDESONIDE AND FORMOTEROL FUMARATE DIHYDRATE 2 PUFF: 160; 4.5 AEROSOL RESPIRATORY (INHALATION) at 19:24

## 2025-07-09 RX ADMIN — CHLORDIAZEPOXIDE HYDROCHLORIDE 50 MG: 25 CAPSULE ORAL at 23:32

## 2025-07-09 RX ADMIN — BUDESONIDE AND FORMOTEROL FUMARATE DIHYDRATE 2 PUFF: 160; 4.5 AEROSOL RESPIRATORY (INHALATION) at 10:38

## 2025-07-09 RX ADMIN — Medication 1 TABLET: at 09:06

## 2025-07-09 RX ADMIN — LORAZEPAM 2 MG: 2 TABLET ORAL at 22:08

## 2025-07-09 RX ADMIN — LORAZEPAM 2 MG: 2 TABLET ORAL at 05:57

## 2025-07-09 RX ADMIN — LORAZEPAM 2 MG: 2 TABLET ORAL at 13:40

## 2025-07-09 RX ADMIN — PRIMIDONE 75 MG: 50 TABLET ORAL at 21:23

## 2025-07-09 RX ADMIN — GABAPENTIN 200 MG: 100 CAPSULE ORAL at 21:23

## 2025-07-09 RX ADMIN — METOPROLOL SUCCINATE 12.5 MG: 25 TABLET, EXTENDED RELEASE ORAL at 11:14

## 2025-07-09 RX ADMIN — LORAZEPAM 2 MG: 2 TABLET ORAL at 11:13

## 2025-07-09 RX ADMIN — QUETIAPINE FUMARATE 150 MG: 50 TABLET, EXTENDED RELEASE ORAL at 21:23

## 2025-07-09 RX ADMIN — Medication 100 MG: at 09:09

## 2025-07-09 RX ADMIN — LORAZEPAM 2 MG: 2 TABLET ORAL at 23:32

## 2025-07-09 RX ADMIN — LORAZEPAM 2 MG: 2 TABLET ORAL at 16:02

## 2025-07-09 RX ADMIN — LORAZEPAM 2 MG: 2 TABLET ORAL at 09:02

## 2025-07-09 RX ADMIN — GABAPENTIN 200 MG: 100 CAPSULE ORAL at 09:01

## 2025-07-09 RX ADMIN — Medication 2 TABLET: at 09:04

## 2025-07-09 NOTE — NURSING NOTE
Spoke with Dr. Lynn about patient labs and plan of care for patient and Dr. Lynn advises to admit patient on ativan detox with a repeat CMP in the morning. RBOX2. I have advised the ED provider of Dr. Lynn's recommendation as well.     Pharmacy and Lead RN Radha notified of patient admission.     EKG ordered @9923
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
## 2025-07-09 NOTE — NURSING NOTE
Patient presents to intake with request to detox from alcohol. Patient states that he was sober for 15-20 years and started drink about 6 months ago. Patient states that he drinks about 6 ounces of rum daily. Patient initial ethanol upon arriving was 324. Patient's current ethanol is 90. Patient states that he has has experienced alcohol withdrawal in the past and when he tried to stop on his own he had seizures and is currently experiencing some incontinence.     Patient denies any drug use with the exception of hydrocodone which he states he takes for neck pain. Patient toxicology screen is positive for opiates.     Patient is currently scoring an 8 on CIWA. Patient has received 4 mg of ativan, 10 mg reglan, and 4 mg of zofran while in the ED.     Patient denies any SI, HI or AVH    Patient rates depression a 6/10 and anxiety 4/10    Patient abnormal labs  BUN 6.2  AST 51

## 2025-07-09 NOTE — PLAN OF CARE
Goal Outcome Evaluation:  Plan of Care Reviewed With: patient  Patient Agreement with Plan of Care: agrees  Consent Given to Review Plan with: None at this time  Progress: no change  Outcome Evaluation: Met with patient in the office, updating the social history assessment and reviewing the treatment plans. Patient agreeable.      Problem: Adult Behavioral Health Plan of Care  Goal: Plan of Care Review  Outcome: Progressing  Flowsheets (Taken 7/9/2025 1741)  Consent Given to Review Plan with: None at this time  Progress: no change  Patient Agreement with Plan of Care: agrees  Outcome Evaluation: Met with patient in the office, updating the social history assessment and reviewing the treatment plans. Patient agreeable.  Plan of Care Reviewed With: patient     Problem: Adult Behavioral Health Plan of Care  Goal: Patient-Specific Goal (Individualization)  Outcome: Progressing  Flowsheets (Taken 7/9/2025 1741)  Patient Personal Strengths:   resilient   resourceful   motivated for treatment  Patient/Family-Specific Goals (Include Timeframe): coordination of aftercare plans  Individualized Care Needs: individual and group therapy  Anxieties, Fears or Concerns: difficulty hearing  Patient Vulnerabilities: (hearing impairment)   poor physical health   substance abuse/addiction   limited support system   other (see comments)     Problem: Adult Behavioral Health Plan of Care  Goal: Optimized Coping Skills in Response to Life Stressors  Intervention: Promote Effective Coping Strategies  Flowsheets (Taken 7/9/2025 1741)  Supportive Measures:   active listening utilized   self-reflection promoted     Problem: Adult Behavioral Health Plan of Care  Goal: Develops/Participates in Therapeutic [place] to Support Successful Transition  Intervention: Foster Therapeutic [place]  Flowsheets (Taken 7/9/2025 1741)  Trust Relationship/Rapport:   care explained   reassurance provided   choices provided   emotional support provided    thoughts/feelings acknowledged   empathic listening provided   questions answered   questions encouraged     Problem: Adult Behavioral Health Plan of Care  Goal: Develops/Participates in Therapeutic [place] to Support Successful Transition  Intervention: Mutually Develop Transition Plan  Flowsheets (Taken 7/9/2025 1741)  Outpatient/Agency/Support Group Needs:   12 step program (specify)   support group(s) (specify)   outpatient substance abuse treatment (specify)   outpatient psychiatric care (specify)   outpatient medication management   outpatient counseling  Discharge Coordination/Progress: [facility]  Transition Support:   community resources reviewed   follow-up care discussed  Transportation Anticipated: family or friend will provide  Anticipated Discharge Disposition: home with family  Transportation Concerns: none  Current Discharge Risk: substance use/abuse  Concerns to be Addressed:   mental health   substance/tobacco abuse/use   coping/stress   cognitive/perceptual  Readmission Within the Last 30 Days: no previous admission in last 30 days  Patient/Family Anticipated Services at Transition: mental health services  Patient's Choice of Community Agency(s): [facility]  Patient/Family Anticipates Transition to: home with family  Offered/Gave Vendor List: no     Problem: Anxiety Signs/Symptoms  Goal: Enhanced Social, Occupational or Functional Skills (Anxiety Signs/Symptoms)  Intervention: Promote Social, Occupational and Functional Ability  Recent Flowsheet Documentation  Taken 7/9/2025 1741 by iMtchell Pickens LCSW  Trust Relationship/Rapport:   care explained   reassurance provided   choices provided   emotional support provided   thoughts/feelings acknowledged   empathic listening provided   questions answered   questions encouraged     Problem: Alcohol Withdrawal  Goal: Readiness for Change Identified  Intervention: Partner to Facilitate Behavior Change  Recent Flowsheet Documentation  Taken  7/9/2025 1741 by Mitchell Pickens LCSW  Supportive Measures:   active listening utilized   self-reflection promoted     Problem: Depressive Signs/Symptoms  Goal: Increased Participation and Engagement (Depressive Signs/Symptoms)  Intervention: Facilitate Participation and Engagement  Recent Flowsheet Documentation  Taken 7/9/2025 1741 by Mitchell Pickens LCSW  Supportive Measures:   active listening utilized   self-reflection promoted     Problem: Depressive Signs/Symptoms  Goal: Enhanced Self-Esteem and Confidence (Depressive Signs/Symptoms)  Intervention: Promote Confidence and Self-Esteem  Recent Flowsheet Documentation  Taken 7/9/2025 1741 by Mitchell Pickens LCSW  Supportive Measures:   active listening utilized   self-reflection promoted     Problem: Depressive Signs/Symptoms  Goal: Improved Mood Symptoms (Depressive Signs/Symptoms)  Intervention: Promote Mood Improvement  Recent Flowsheet Documentation  Taken 7/9/2025 1741 by Mitchell Pickens LCSW  Supportive Measures:   active listening utilized   self-reflection promoted  Trust Relationship/Rapport:   care explained   reassurance provided   choices provided   emotional support provided   thoughts/feelings acknowledged   empathic listening provided   questions answered   questions encouraged       [id number]  D: Patient is a 65-year-old  male currently residing in [place], [place] where he has been living with a friend, Jason, for the past year or so. He has some college level of education. He last worked in 2014, and he has received disability income since then. The patient identified himself as an alcoholic, reporting that alcohol had created relationship problems for him in the past. He has a history of admission here for detoxification, most recently in April 2024. The patient reported he wanted to be sober in order to achieve a better quality of life, have a relationship with is son, and care for his own soul. The patient  reported that he would follow up with [facility] for aftercare.     A: Patient's affect appeared distressed. His mood appeared depressed and anxious. He was polite and moderately cooperative. His participation in the session today seemed negatively impacted by the level of severe withdrawals he was experiencing. The patient seemed motivated for recovery in order to achieve a better quality of life. However, he verbalized that hearing impairment was a significant barrier. He also did not have the means by which to purchase hearing aids.     P: Patient has been placed on a detox regimen. He will be monitored routinely for his safety. He will follow up with outpatient therapy. He reported that he typically saw a provider at [facility] in [place]. Therapist will continue to assist in coordinating aftercare.

## 2025-07-09 NOTE — NURSING NOTE
Pt arrived to unit at this time. Pt presents with notable tremor and unsteadiness, pt placed on fall precautions. Pt oriented to unit and room, pt provided with copy of unit rules, pt signed unit paperwork. This RN introduced self as primary RN.

## 2025-07-09 NOTE — PLAN OF CARE
Goal Outcome Evaluation:  Plan of Care Reviewed With: patient  Patient Agreement with Plan of Care: agrees     Progress: no change  Outcome Evaluation: Pt was new admit previous shift. Most of day spent in room in bed. Tremor noted and pt c/o sweating. Denies SI/HI/AVH. No distress noted.

## 2025-07-10 PROCEDURE — 97162 PT EVAL MOD COMPLEX 30 MIN: CPT

## 2025-07-10 PROCEDURE — 94664 DEMO&/EVAL PT USE INHALER: CPT

## 2025-07-10 PROCEDURE — 94799 UNLISTED PULMONARY SVC/PX: CPT

## 2025-07-10 PROCEDURE — 99232 SBSQ HOSP IP/OBS MODERATE 35: CPT | Performed by: PSYCHIATRY & NEUROLOGY

## 2025-07-10 RX ADMIN — TRAZODONE HYDROCHLORIDE 100 MG: 50 TABLET ORAL at 21:55

## 2025-07-10 RX ADMIN — Medication 1 TABLET: at 09:23

## 2025-07-10 RX ADMIN — NALOXEGOL OXALATE 25 MG: 25 TABLET, FILM COATED ORAL at 09:23

## 2025-07-10 RX ADMIN — LORAZEPAM 2 MG: 2 TABLET ORAL at 09:22

## 2025-07-10 RX ADMIN — LORAZEPAM 2 MG: 2 TABLET ORAL at 13:05

## 2025-07-10 RX ADMIN — Medication 100 MG: at 09:23

## 2025-07-10 RX ADMIN — LORAZEPAM 2 MG: 2 TABLET ORAL at 02:58

## 2025-07-10 RX ADMIN — GABAPENTIN 200 MG: 100 CAPSULE ORAL at 21:55

## 2025-07-10 RX ADMIN — METOPROLOL SUCCINATE 12.5 MG: 25 TABLET, EXTENDED RELEASE ORAL at 09:22

## 2025-07-10 RX ADMIN — QUETIAPINE FUMARATE 150 MG: 50 TABLET, EXTENDED RELEASE ORAL at 21:56

## 2025-07-10 RX ADMIN — BUDESONIDE AND FORMOTEROL FUMARATE DIHYDRATE 2 PUFF: 160; 4.5 AEROSOL RESPIRATORY (INHALATION) at 19:29

## 2025-07-10 RX ADMIN — LORAZEPAM 2 MG: 2 TABLET ORAL at 21:55

## 2025-07-10 RX ADMIN — Medication 2 TABLET: at 09:23

## 2025-07-10 RX ADMIN — TIOTROPIUM BROMIDE INHALATION SPRAY 2 PUFF: 3.12 SPRAY, METERED RESPIRATORY (INHALATION) at 07:14

## 2025-07-10 RX ADMIN — GABAPENTIN 200 MG: 100 CAPSULE ORAL at 09:22

## 2025-07-10 RX ADMIN — BUDESONIDE AND FORMOTEROL FUMARATE DIHYDRATE 2 PUFF: 160; 4.5 AEROSOL RESPIRATORY (INHALATION) at 07:14

## 2025-07-10 RX ADMIN — PRIMIDONE 75 MG: 50 TABLET ORAL at 21:55

## 2025-07-10 RX ADMIN — CYCLOBENZAPRINE 10 MG: 10 TABLET, FILM COATED ORAL at 21:55

## 2025-07-10 RX ADMIN — LORAZEPAM 2 MG: 2 TABLET ORAL at 14:19

## 2025-07-10 NOTE — PLAN OF CARE
Goal Outcome Evaluation:  Plan of Care Reviewed With: patient        Progress: no change  Outcome Evaluation: Pt presents w/ fair bed mobility, transfers, and gait limited by acute physical w/d symptoms. Pt would benefit from skilled PT. Anticipate d/c home.    Anticipated Discharge Disposition (PT): home with assist, home

## 2025-07-10 NOTE — THERAPY EVALUATION
"Acute Care - Physical Therapy Initial Evaluation   [place]     Patient Name: Rudolph Joaquin  : 1960  MRN: [id number]  Today's Date: 7/10/2025   Onset of Illness/Injury or Date of Surgery: 25  Visit Dx:   No diagnosis found.  Patient Active Problem List   Diagnosis    Alcohol dependence with withdrawal    Vitamin D deficiency    Alcohol use disorder, severe, dependence    Back pain    COPD (chronic obstructive pulmonary disease)    GERD (gastroesophageal reflux disease)    Depression    Anxiety    Alcohol abuse    ETOH abuse    HTN (hypertension)    Alcohol dependence     Past Medical History:   Diagnosis Date    Alcohol abuse     Alcohol withdrawal seizure     \"2012\"    Alcoholism     Anxiety     Back pain     Bone spur     Bone spur of neck    COPD (chronic obstructive pulmonary disease)     DJD (degenerative joint disease)     GERD (gastroesophageal reflux disease)     H/O gastroesophageal reflux (GERD)     HTN (hypertension)     Rotator cuff tear, left     Withdrawal symptoms, alcohol      Past Surgical History:   Procedure Laterality Date    CERVICAL SPINE SURGERY      c5 c6 c6 c7  - screws and plate    DENTAL PROCEDURE      HEMORRHOIDECTOMY      TONSILLECTOMY       PT Assessment (Last 12 Hours)       PT Evaluation and Treatment       Row Name 07/10/25 1111          Physical Therapy Time and Intention    Subjective Information no complaints  -CS     Document Type evaluation  -CS     Mode of Treatment physical therapy  -CS     Patient Effort adequate  -CS     Comment Pt seen bedside for evaluation this AM. Pt reports being (I) w/ mobility prior to admission. Pt agreed to evaluation.  -CS       Row Name 07/10/25 1111          General Information    Patient Profile Reviewed yes  -CS     Onset of Illness/Injury or Date of Surgery 25  -CS     Referring Physician Rupali  -CS     Patient Observations alert;cooperative;agree to therapy  -CS     General Observations of Patient Pt supine, NAD  -CS  "    Prior Level of Function independent:  -CS     Risks Reviewed patient:;LOB;nausea/vomiting;dizziness;increased discomfort;change in vital signs  -CS     Benefits Reviewed patient:;improve function;increase independence;increase strength;increase balance;decrease pain;decrease risk of DVT;improve skin integrity;increase knowledge  -[facility] Name 07/10/25 1111          Pain    Pretreatment Pain Rating 0/10 - no pain  -CS     Posttreatment Pain Rating 0/10 - no pain  -CS     Pre/Posttreatment Pain Comment no c/o  -CS       Row Name 07/10/25 1111          Cognition    Orientation Status (Cognition) oriented x 3  -[facility] Name 07/10/25 1111          Range of Motion (ROM)    Range of Motion bilateral lower extremities;ROM is WFL  -[facility] Name 07/10/25 1111          Strength Comprehensive (MMT)    Comment, General Manual Muscle Testing (MMT) Assessment (B)LE WFL  -[facility] 07/10/25 1111          Bed Mobility    Bed Mobility bed mobility (all) activities  -     All Activities, [place] (Bed Mobility) standby assist  -CS       Row Name 07/10/25 1111          Transfers    Transfers sit-stand transfer;stand-sit transfer  -CS       Row Name 07/10/25 1111          Sit-Stand Transfer    Sit-Stand [place] (Transfers) contact guard;minimum assist (75% patient effort)  -     Assistive Device (Sit-Stand Transfers) walker, front-wheeled  -CS       Row Name 07/10/25 1111          Stand-Sit Transfer    Stand-Sit [place] (Transfers) contact guard;minimum assist (75% patient effort)  -     Assistive Device (Stand-Sit Transfers) walker, front-wheeled  -CS       Row Name 07/10/25 1111          Gait/Stairs (Locomotion)    Gait/Stairs Locomotion gait/ambulation assistive device;gait/ambulation independence;distance ambulated  -     [place] Level (Gait) contact guard;minimum assist (75% patient effort)  -     Assistive Device (Gait) walker, front-wheeled  -     Patient was able to  Ambulate yes  -CS     Distance in Feet (Gait) 80  -CS     Pattern (Gait) step-through;step-to  -CS     Deviations/Abnormal Patterns (Gait) ataxic;festinating/shuffling;stride length decreased;base of support, narrow  -CS     Comment, (Gait/Stairs) pt required min(A) to guide walker at times.  -CS       Row Name 07/10/25 1111          Balance    Comment, Balance Standing Dynamic - FAIR(-) - requires UE support  -CS       Row Name 07/10/25 1111          Plan of Care Review    Plan of Care Reviewed With patient  -CS     Progress no change  -CS     Outcome Evaluation Pt presents w/ fair bed mobility, transfers, and gait limited by acute physical w/d symptoms. Pt would benefit from skilled PT. Anticipate d/c home.  -       Row Name 07/10/25 1111          Positioning and Restraints    Pre-Treatment Position in bed  -CS     Post Treatment Position bed  -CS     In Bed supine;call light within reach;encouraged to call for assist;exit alarm on  -CS       Row Name 07/10/25 1111          Therapy Assessment/Plan (PT)    Functional Level at Time of Evaluation (PT) CGA/min(A)  -CS     PT Diagnosis (PT) impaired functional mobility  -CS     Rehab Potential (PT) good  -CS     Criteria for Skilled Interventions Met (PT) yes;meets criteria;skilled treatment is necessary  -CS     Therapy Frequency (PT) 2 times/wk  2-5x/week  -CS     Predicted Duration of Therapy Intervention (PT) while in house  -CS     Problem List (PT) problems related to;balance;mobility;strength  -CS     Activity Limitations Related to Problem List (PT) unable to ambulate safely;unable to transfer safely  -CS     Therapy Assessment/Plan (PT) Pt presents w/ fair bed mobility, transfers, and gait limited by acute physical w/d symptoms. Pt would benefit from skilled PT. Anticipate d/c home.  -       Row Name 07/10/25 1111          PT Evaluation Complexity    History, PT Evaluation Complexity 3 or more personal factors and/or comorbidities  -CS     Examination of  Body Systems (PT Eval Complexity) total of 4 or more elements  -CS     Clinical Presentation (PT Evaluation Complexity) evolving  -CS     Clinical Decision Making (PT Evaluation Complexity) moderate complexity  -CS     Overall Complexity (PT Evaluation Complexity) moderate complexity  -CS       Row Name 07/10/25 1111          Therapy Plan Review/Discharge Plan (PT)    Therapy Plan Review (PT) evaluation/treatment results reviewed;care plan/treatment goals reviewed;risks/benefits reviewed;current/potential barriers reviewed;participants voiced agreement with care plan;participants included;patient  -CS       Row Name 07/10/25 1111          Physical Therapy Goals    Transfer Goal Selection (PT) transfer, PT goal 1  -CS     Gait Training Goal Selection (PT) gait training, PT goal 1  -CS       Row Name 07/10/25 1111          Transfer Goal 1 (PT)    Activity/Assistive Device (Transfer Goal 1, PT) transfers, all  -CS     [place] Level/Cues Needed (Transfer Goal 1, PT) standby assist  -CS     Time Frame (Transfer Goal 1, PT) long term goal (LTG);by discharge  -CS       Row Name 07/10/25 1111          Gait Training Goal 1 (PT)    Activity/Assistive Device (Gait Training Goal 1, PT) gait (walking locomotion);assistive device use  -CS     [place] Level (Gait Training Goal 1, PT) standby assist  -CS     Distance (Gait Training Goal 1, PT) 150  -CS     Time Frame (Gait Training Goal 1, PT) long term goal (LTG);by discharge  -CS               User Key  (r) = Recorded By, (t) = Taken By, (c) = Cosigned By      Initials Name Provider Type    CS Jeremiah Pfeiffer, PT Physical Therapist                    Physical Therapy Education       Title: PT OT SLP Therapies (Done)       Topic: Physical Therapy (Done)       Point: Mobility training (Done)       Learning Progress Summary            Patient Acceptance, E,TB, VU by CS at 7/10/2025 1121                      Point: Home exercise program (Done)       Learning Progress Summary             Patient Acceptance, E,TB, VU by  at 7/10/2025 1121                      Point: Body mechanics (Done)       Learning Progress Summary            Patient Acceptance, E,TB, VU by  at 7/10/2025 1121                      Point: Precautions (Done)       Learning Progress Summary            Patient Acceptance, E,TB, VU by  at 7/10/2025 1121                                      User Key       Initials Effective Dates Name Provider Type Discipline     05/31/23 -  Jeremiah Pfeiffer, PT Physical Therapist PT                  PT Recommendation and Plan  Anticipated Discharge Disposition (PT): home with assist, home  Planned Therapy Interventions (PT): balance training, bed mobility training, gait training, home exercise program, neuromuscular re-education, patient/family education, strengthening, transfer training  Therapy Frequency (PT): 2 times/wk (2-5x/week)  Plan of Care Reviewed With: patient  Progress: no change  Outcome Evaluation: Pt presents w/ fair bed mobility, transfers, and gait limited by acute physical w/d symptoms. Pt would benefit from skilled PT. Anticipate d/c home.       Time Calculation:    PT Charges       Row Name 07/10/25 1122             Time Calculation    Start Time 1000  -CS      PT Received On 07/10/25  -      PT Goal Re-Cert Due Date 07/24/25  -                User Key  (r) = Recorded By, (t) = Taken By, (c) = Cosigned By      Initials Name Provider Type     Jeremiah Pfeiffer, PT Physical Therapist                  Therapy Charges for Today       Code Description Service Date Service Provider Modifiers Qty    98329514619 HC PT EVAL MOD COMPLEXITY 4 7/10/2025 Jeremiah Pfeiffer, PT GP 1            PT G-Codes  AM-PAC 6 Clicks Score (PT): 15    Jeremiah Pfeiffer PT  7/10/2025

## 2025-07-10 NOTE — NURSING NOTE
Notified Dr. Zapien of patient unsteadiness and stumbling around. New order ntd for sitter at this time.

## 2025-07-10 NOTE — PLAN OF CARE
Goal Outcome Evaluation:  Plan of Care Reviewed With: patient  Patient Agreement with Plan of Care: agrees  Progress: improving  Outcome Evaluation: Therapist met with Patient to review treatment progress; Patient agreeable.      Problem: Adult Behavioral Health Plan of Care  Goal: Plan of Care Review  Outcome: Progressing  Flowsheets  Taken 7/10/2025 1517 by Sharon Iraheta  Progress: improving  Patient Agreement with Plan of Care: agrees  Outcome Evaluation:   Therapist met with Patient to review treatment progress   Patient agreeable.  Plan of Care Reviewed With: patient  Taken 7/9/2025 1741 by Mitchell Pickens LCSW  Consent Given to Review Plan with: None at this time  Goal: Optimized Coping Skills in Response to Life Stressors  Outcome: Progressing  Intervention: Promote Effective Coping Strategies  Flowsheets (Taken 7/10/2025 1517)  Supportive Measures:   active listening utilized   counseling provided   goal-setting facilitated   verbalization of feelings encouraged  Goal: Develops/Participates in Therapeutic [place] to Support Successful Transition  Outcome: Progressing  Intervention: Foster Therapeutic [place]  Flowsheets (Taken 7/10/2025 1517)  Trust Relationship/Rapport:   care explained   reassurance provided   choices provided   thoughts/feelings acknowledged   emotional support provided   empathic listening provided   questions answered   questions encouraged     DATA: Therapist met with Patient individually this date. Patient agreeable to discuss current treatment progress and discharge concerns.     CLINICAL MANUVERING/INTERVENTIONS:  Assisted Patient in processing session content; acknowledged and normalized Patient’s thoughts, feelings, and concerns by utilizing a person-centered approach in efforts to build appropriate rapport and a positive therapeutic relationship with open and honest communication. Allowed Patient to ventilate regarding current stressors and triggers for negative  emotions and thoughts in a safe nonjudgmental environment with unconditional positive regard, active listening skills, and empathy.     ASSESSMENT: Patient was seen 1-1 for a follow up today. He continues to receive treatment for alcohol detox. Patient reports ongoing withdrawal symptoms today. I spoke with him about treatment options post discharge. He is not agreeable to follow up with anyone other than [facility]. He states that he will return home with his roommate who does not drink. We discussed relapse prevention he states that he like to spend time with his dog, and fish. He feels that these could be helpful coping skills, for him. He voices having good influences and supports in his life overall.     PLAN:   Patient will continue stabilization. Patient will continue to receive services offered by Treatment Team.     Patient is not agreeable to aftercare at this time.

## 2025-07-10 NOTE — PROGRESS NOTES
"INPATIENT PSYCHIATRIC PROGRESS NOTE    Name:  Rudolph Joaquin  :  1960  MRN:  [id number]  Visit Number:  [id number]  Length of stay:  1    SUBJECTIVE    CC/Focus of Exam: alcohol withdrawals    INTERVAL HISTORY:  The patient's alcohol withdrawals got worse last night requiring more frequent dosing of lorazepam and also got an extra dose of Librium 50 mg last night. He is weak and unsteady on his feel and has been put on 1:1 monitoring. He states he is feeling better now.   Depression rating 2/10  Anxiety rating 6/10  Sleep: fair  Withdrawal sx: None reported at this time.  Cravin/10    Review of Systems   Respiratory: Negative.     Cardiovascular: Negative.    Gastrointestinal:  Positive for nausea.   Neurological:  Positive for dizziness, tremors and weakness.   Psychiatric/Behavioral:  The patient is nervous/anxious.        OBJECTIVE    Temp:  [97 °F (36.1 °C)-98.1 °F (36.7 °C)] 98 °F (36.7 °C)  Heart Rate:  [] 93  Resp:  [16-18] 16  BP: (124-155)/() 125/86    MENTAL STATUS EXAM:  Appearance:Casually dressed, good hygeine.   Cooperation:Cooperative  Psychomotor: No psychomotor agitation/retardation, No EPS, No motor tics  Speech-normal rate, amount.  Mood \"better\"   Affect-congruent, appropriate, stable  Thought Content-goal directed, no delusional material present  Thought process-linear, organized.  Suicidality: No SI  Homicidality: No HI  Perception: No AH/VH  Insight-fair   Judgement-fair    Lab Results (last 24 hours)       ** No results found for the last 24 hours. **               Imaging Results (Last 24 Hours)       ** No results found for the last 24 hours. **               ECG/EMG Results (most recent)       None             ALLERGIES: Antihistamines, diphenhydramine-type    Medication Review:   Scheduled Medications:  B-complex with vitamin C, 2 tablet, Oral, Daily  budesonide-formoterol, 2 puff, Inhalation, BID - RT   And  tiotropium bromide monohydrate, 2 puff, Inhalation, " Daily - RT  gabapentin, 200 mg, Oral, BID  LORazepam, 2 mg, Oral, 3 times per day   Followed by  [START ON 7/11/2025] LORazepam, 1.5 mg, Oral, 3 times per day   Followed by  [START ON 7/12/2025] LORazepam, 1 mg, Oral, 3 times per day   Followed by  [START ON 7/13/2025] LORazepam, 0.5 mg, Oral, 3 times per day  metoprolol succinate XL, 12.5 mg, Oral, Q24H  multivitamin with minerals, 1 tablet, Oral, Daily  Naloxegol Oxalate, 25 mg, Oral, Daily  primidone, 75 mg, Oral, Nightly  QUEtiapine XR, 150 mg, Oral, Nightly  thiamine, 100 mg, Oral, Daily         PRN Medications:    aluminum-magnesium hydroxide-simethicone    benzonatate    cyclobenzaprine    famotidine    HYDROcodone-acetaminophen    ibuprofen    loperamide    LORazepam **FOLLOWED BY** [START ON 7/11/2025] LORazepam **FOLLOWED BY** [START ON 7/12/2025] LORazepam **FOLLOWED BY** [START ON 7/13/2025] LORazepam    LORazepam    magnesium hydroxide    ondansetron ODT    polyethylene glycol    sodium chloride    traZODone   All medications reviewed.    ASSESSMENT & PLAN:      Alcohol use disorder, severe, dependence  -Ativan detox  -Thiamine and folate       Depression unspecified    Anxiety unspecified  -Continue quetiapine       HTN (hypertension)  -Metoprolol       Back pain  -Cyclobenzaprine  -Gabapentin       Nicotine use disorder  -Nicotine patch       COPD  -Symbicort  -Spiriva    Special precautions: Special Precautions Level 4 (q30 min checks).    Behavioral Health Treatment Plan and Problem List: I have reviewed and approved the Behavioral Health Treatment Plan and Problem list.  The patient has had a chance to review and agrees with the treatment plan.    Copied text in portions of this note has been reviewed and is accurate as of 07/10/25         Clinician:  Sundar Zapien MD  07/10/25  13:44 EDT

## 2025-07-10 NOTE — PLAN OF CARE
Goal Outcome Evaluation:  Plan of Care Reviewed With: patient  Plan of Care Reviewed With: patient  Patient Agreement with Plan of Care: agrees     Progress: improving        Pt required additional medications related to increased withdrawal symptoms. Total of Ativan 4mg for this shift. Pt also given Norco for neck pain.

## 2025-07-10 NOTE — NURSING NOTE
"Dr. Zapien contacted related to patient having increased CIWA score of 13, increased Vitals, and increased confusion.  Pt sat down in hallway and sts, \"The floor feels cool and I just am feeling like I need a stretch.\" Pt showing increased signs of confusion and sts, \"I am feeling cloudy.\" Pt unsure of date but able to report is 2025.     Vitals:  Temperature: 97.9   Blood pressure: 153/110  Heart rate: 119  Oxygen: 97% on room air  Respirations: 18     CIWA 13    New orders given:   Change/Modify  Ativan 2mg Q 2 hour prn to Ativan 2mg Q 1 hour prn.    Librium 50mg PO once       "

## 2025-07-10 NOTE — PLAN OF CARE
Goal Outcome Evaluation:  Plan of Care Reviewed With: patient  Plan of Care Reviewed With: patient  Patient Agreement with Plan of Care: agrees     Progress: no change  Outcome Evaluation: Patient has been calm and cooperative this shift. Pt rates anxiety and depression both 5/10. Pt denies SI/HI/AVH. Pt reports better sleep and good appetite. Pt has became increasingly unsteady and stumbling around with walker and has required frequent redirection. Sitter order ntd to prevent falls. Pt has had no complaints this shift.

## 2025-07-11 PROCEDURE — 99232 SBSQ HOSP IP/OBS MODERATE 35: CPT | Performed by: PSYCHIATRY & NEUROLOGY

## 2025-07-11 PROCEDURE — 94664 DEMO&/EVAL PT USE INHALER: CPT

## 2025-07-11 PROCEDURE — 94799 UNLISTED PULMONARY SVC/PX: CPT

## 2025-07-11 RX ADMIN — LORAZEPAM 1.5 MG: 0.5 TABLET ORAL at 21:36

## 2025-07-11 RX ADMIN — QUETIAPINE FUMARATE 150 MG: 50 TABLET, EXTENDED RELEASE ORAL at 21:36

## 2025-07-11 RX ADMIN — METOPROLOL SUCCINATE 12.5 MG: 25 TABLET, EXTENDED RELEASE ORAL at 09:08

## 2025-07-11 RX ADMIN — LORAZEPAM 1.5 MG: 0.5 TABLET ORAL at 14:04

## 2025-07-11 RX ADMIN — GABAPENTIN 200 MG: 100 CAPSULE ORAL at 21:35

## 2025-07-11 RX ADMIN — TRAZODONE HYDROCHLORIDE 100 MG: 50 TABLET ORAL at 21:36

## 2025-07-11 RX ADMIN — PRIMIDONE 75 MG: 50 TABLET ORAL at 21:36

## 2025-07-11 RX ADMIN — LORAZEPAM 1.5 MG: 0.5 TABLET ORAL at 09:09

## 2025-07-11 RX ADMIN — Medication 2 TABLET: at 09:15

## 2025-07-11 RX ADMIN — Medication 1 TABLET: at 09:09

## 2025-07-11 RX ADMIN — CYCLOBENZAPRINE 10 MG: 10 TABLET, FILM COATED ORAL at 21:36

## 2025-07-11 RX ADMIN — GABAPENTIN 200 MG: 100 CAPSULE ORAL at 09:08

## 2025-07-11 RX ADMIN — TIOTROPIUM BROMIDE INHALATION SPRAY 2 PUFF: 3.12 SPRAY, METERED RESPIRATORY (INHALATION) at 07:26

## 2025-07-11 RX ADMIN — BUDESONIDE AND FORMOTEROL FUMARATE DIHYDRATE 2 PUFF: 160; 4.5 AEROSOL RESPIRATORY (INHALATION) at 19:40

## 2025-07-11 RX ADMIN — HYDROCODONE BITARTRATE AND ACETAMINOPHEN 1 TABLET: 7.5; 325 TABLET ORAL at 21:36

## 2025-07-11 RX ADMIN — NALOXEGOL OXALATE 25 MG: 25 TABLET, FILM COATED ORAL at 09:08

## 2025-07-11 RX ADMIN — BUDESONIDE AND FORMOTEROL FUMARATE DIHYDRATE 2 PUFF: 160; 4.5 AEROSOL RESPIRATORY (INHALATION) at 07:26

## 2025-07-11 RX ADMIN — Medication 100 MG: at 09:08

## 2025-07-11 NOTE — PLAN OF CARE
Goal Outcome Evaluation:  Plan of Care Reviewed With: patient  Patient Agreement with Plan of Care: agrees  Progress: improving  Outcome Evaluation: Therapist met with Patient to review treatment progress; Patient agreeable.      Problem: Adult Behavioral Health Plan of Care  Goal: Plan of Care Review  Outcome: Progressing  Flowsheets  Taken 7/11/2025 1432 by Sharon Iraheta  Progress: improving  Patient Agreement with Plan of Care: agrees  Plan of Care Reviewed With: patient  Taken 7/10/2025 1517 by Sharon Iraheta  Outcome Evaluation:   Therapist met with Patient to review treatment progress   Patient agreeable.  Taken 7/9/2025 1741 by Mitchell Pickens [facility]  Consent Given to Review Plan with: None at this time  Goal: Optimized Coping Skills in Response to Life Stressors  Outcome: Progressing  Intervention: Promote Effective Coping Strategies  Flowsheets (Taken 7/11/2025 1432)  Supportive Measures:   active listening utilized   counseling provided   goal-setting facilitated   verbalization of feelings encouraged  Goal: Develops/Participates in Therapeutic [place] to Support Successful Transition  Outcome: Progressing  Intervention: Foster Therapeutic [place]  Flowsheets (Taken 7/11/2025 1432)  Trust Relationship/Rapport:   care explained   reassurance provided   choices provided   thoughts/feelings acknowledged   emotional support provided   empathic listening provided   questions answered   questions encouraged     DATA: Therapist met with Patient individually this date. Patient agreeable to discuss current treatment progress and discharge concerns.     CLINICAL MANUVERING/INTERVENTIONS:  Assisted Patient in processing session content; acknowledged and normalized Patient’s thoughts, feelings, and concerns by utilizing a person-centered approach in efforts to build appropriate rapport and a positive therapeutic relationship with open and honest communication. Allowed Patient to ventilate regarding current  stressors and triggers for negative emotions and thoughts in a safe nonjudgmental environment with unconditional positive regard, active listening skills, and empathy.     ASSESSMENT: Patient was seen 1-1 for a follow up today. He continues to receive treatment for detox. Patient seems to be doing a little better today. He was up in the days room when we met. He states that he has been thinking a lot about his triggers, as these seem to lead to relapse for him. He states that anxiety and anger are big triggers for him. We spoke about the importance of developing a plan for how to deal with these triggers, rather than feeling blind sided when they happen. He states that he will continue to think about alternative healthy coping skills that he can try to use to replace drinking. Patient requested a bible to read and one was provided. He reports that he is eager to return home, but expects that he will need to stay for a few more days.     PLAN:   Patient will continue stabilization. Patient will continue to receive services offered by Treatment Team.

## 2025-07-11 NOTE — PAYOR COMM NOTE
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   Spiritual Care   Spiritual Care Visit Type -- -- follow-up  -TP -- --   Spiritual Care Source -- --  initiative  -TP -- --   Receptivity to Spiritual Care -- -- unresponsive  -TP -- --   Spiritual Care Request -- -- advance directives facilitation  -TP -- --   Response to Spiritual Care -- -- visit timing not optimal (follow-up planned)  -TP -- --   Use of Spiritual Resources -- -- prayer  -TP -- --   Spiritual Care Follow-Up -- -- follow-up planned regularly for general support  -TP -- --   HEENT   HEENT WDL -- -- -- -- X;hearing change  -LV   Right Hearing Change -- -- -- -- decreased  -LV   Mouth/Teeth WDL   Mouth/Teeth WDL -- -- -- -- X;teeth  -LV   Neck WDL   Neck WDL -- -- -- -- WDL  -LV   Cognitive   Additional Documentation -- -- -- -- CIWA-Ar (Alcohol Withdrawal Assessment) (Group)  -LV   Cognitive/Neuro/Behavioral WDL   Cognitive/Neuro/Behavioral WDL -- -- -- -- X;mood/behavior  -LV   Mood/Behavior -- -- -- -- anxious;cooperative  -LV    Clinical [place] Withdrawal Assessment of Alcohol, Revised   Nausea and Vomiting -- -- -- -- 0  -LV   Tactile Disturbances -- -- -- -- 0  -LV   Tremor -- -- -- -- 2  -LV   Auditory Disturbances -- -- -- -- 1  -LV   Paroxysmal Sweats -- -- -- -- 0  -LV   Visual Disturbances -- -- -- -- 0  -LV   Anxiety -- -- -- -- 1  -LV   Headache, Fullness in Head -- -- -- -- 0  -LV   Agitation -- -- -- -- 0  -LV   Orientation and Clouding of Sensorium -- -- -- -- 3  -LV   CIWA-Ar Total -- -- -- -- 7  -LV   Neuro           Vital Signs    Row Name 07/11/25 0949 07/11/25 0800 07/11/25 0726 07/11/25 0230 07/10/25 2035   Vital Signs   Temp -- 98.1 °F (36.7 °C) -- 97.9 °F (36.6 °C) --  refused standing vitals   Temp src -- Temporal -- Temporal --   Pulse 83 91 94 98 --   Heart Rate Source Monitor Monitor -- Monitor --   Resp 18 18 16 18 --   Resp Rate Source Visual Visual -- Visual --   /64 97/61 -- 131/89 --   Noninvasive MAP (mmHg) -- -- -- -- --   BP Location Right arm Left arm -- Left arm --   BP Method Automatic Automatic -- Automatic --   Patient Position Lying Lying -- Lying        Patient Observation   Patient Observations -- -- -- -- --   Row Name 07/10/25 2030 07/10/25 1950 07/10/25 1929 07/10/25 1405 07/10/25 1300   Vital Signs   Temp 98.1 °F (36.7 °C) -- -- 97.4 °F (36.3 °C) --   Temp src Temporal -- -- Temporal --   Pulse 89 -- 85 101 93   Heart Rate Source Monitor -- -- Monitor Monitor   Resp 16 -- 18 18 --   Resp Rate Source Visual -- -- Visual --   /89 -- -- 132/92 125/86   Noninvasive MAP (mmHg) -- -- -- -- --   BP Location Right arm -- -- Right arm Left arm   BP Method Automatic -- -- Automatic Automatic   Patient Position Lying -- -- Sitting Sitting           Current Scheduled Medications  Collapse  Hide  (From now, onward)    Start   Ordered Stop   07/13/25 0000  LORazepam (ATIVAN) tablet 0.5 mg  0.5 mg,   Oral,   3 times per day        References:    CIWA Based Dosing for Alcohol Withdrawal     "Lexidrug    Pediatrics   \"Followed by\" Linked Group Details    07/09/25 0549 07/14/25 0759   07/12/25 0000  LORazepam (ATIVAN) tablet 1 mg  1 mg,   Oral,   3 times per day        References:    CIWA Based Dosing for Alcohol Withdrawal    Lexidrug    Pediatrics   \"Followed by\" Linked Group Details    07/09/25 0549 07/13/25 0759   07/11/25 0000  LORazepam (ATIVAN) tablet 1.5 mg  1.5 mg,   Oral,   3 times per day        References:    Lexidrug    Pediatrics   \"Followed by\" Linked Group Details    07/09/25 0549 07/12/25 0759   07/09/25 2100  primidone (MYSOLINE) tablet 75 mg  75 mg,   Oral,   Nightly        References:    Lexidrug    Pediatrics    07/09/25 0856 --   07/09/25 2100  QUEtiapine fumarate ER (SEROquel XR) tablet 150 mg  150 mg,   Oral,   Nightly        References:    Lexidrug    Pediatrics    07/09/25 0856 --   07/09/25 1015  metoprolol succinate XL (TOPROL-XL) 24 hr tablet 12.5 mg  12.5 mg,   Oral,   Every 24 Hours Scheduled        References:    Lexidrug    Pediatrics    07/09/25 0928 --   07/09/25 0945  budesonide-formoterol (SYMBICORT) 160-4.5 MCG/ACT inhaler 2 puff  2 puff,   Inhalation,   2 Times Daily - RT        References:    Lexidrug    Pediatrics   \"And\" Linked Group Details    07/09/25 0856 --   07/09/25 0945  tiotropium (SPIRIVA RESPIMAT) 2.5 mcg/act aerosol solution inhaler  2 puff,   Inhalation,   Daily - RT        References:    Lexidrug    Pediatrics   \"And\" Linked Group Details    07/09/25 0856 --   07/09/25 0945  gabapentin (NEURONTIN) capsule 200 mg  200 mg,   Oral,   2 Times Daily        References:    Lexidrug    Pediatrics    07/09/25 0856 --   07/09/25 0945  Naloxegol Oxalate (MOVANTIK) tablet 25 mg  25 mg,   Oral,   Daily        References:    Lexidrug    Pediatrics    07/09/25 0856 --   07/09/25 0900  B-complex with vitamin C tablet 2 tablet  2 tablet,   Oral,   Daily        References:    Lexidrug    Pediatrics    07/09/25 0549 --   07/09/25 0900  thiamine (VITAMIN B-1) tablet 100 " mg  100 mg,   Oral,   Daily        References:    Lexidrug    Pediatrics    07/09/25 0549 --   07/09/25 0900  multivitamin with minerals 1 tablet  1 tablet,   Oral,   Daily        References:    Lexidrug    Pediatrics    07/09/25 0549           PRN MEDICATIONS RECEIVED WITHIN THE LAST 24  HOURS:    cyclobenzaprine (FLEXERIL) tablet 10 mg  Dose: 10 mg  Freq: 3 Times Daily PRN Route: PO  PRN Reason: Muscle Spasms  Start: 07/09/25 1554  RECEIVED ON 07/10/2025 AT 2155 PM EST    LORazepam (ATIVAN) tablet 2 mg  Dose: 2 mg  Freq: Every 4 Hours PRN Route: PO  PRN Reason: Withdrawal  PRN Comment: 4 Hours After Scheduled Dose for CIWA 10 or Greater, SBP >140, DBP >90, or HR >100  Start: 07/10/25 0800 End: 07/11/25 0759  RECEIVED ON 07/10/32081 AT 1305 PM EST    LORazepam (ATIVAN) tablet 2 mg  Dose: 2 mg  Freq: Every 1 Hour PRN Route: PO  PRN Reason: Withdrawal  Start: 07/09/25 2324 End: 07/10/25 2323  RECEIVED ON 07/10/2025 AT 0258 AM EST    traZODone (DESYREL) tablet 100 mg  Dose: 100 mg  Freq: Nightly PRN Route: PO  PRN Reason: Sleep  Start: 07/09/25 0856  RECEIVED ON 07/10/2025 AT 2155 PM EST

## 2025-07-11 NOTE — PROGRESS NOTES
"INPATIENT PSYCHIATRIC PROGRESS NOTE    Name:  Rudolph Joaquin  :  1960  MRN:  [id number]  Visit Number:  [id number]  Length of stay:  2    SUBJECTIVE    CC/Focus of Exam: alcohol withdrawals    INTERVAL HISTORY:  The patient's has been weak and tremulous and unsteady on his feet. He has been resting a lot. He was seen in his room where he woke up from a nap, and is oriented to person, place and situation. He states he feels the withdrawals are getting better.   Depression rating 2/10  Anxiety rating 6/10  Sleep: fair  Withdrawal sx: weakness, tremors, unsteady gait.   Cravin/10    Review of Systems   Respiratory: Negative.     Cardiovascular: Negative.    Gastrointestinal:  Positive for nausea.   Neurological:  Positive for dizziness, tremors and weakness.   Psychiatric/Behavioral:  The patient is nervous/anxious.        OBJECTIVE    Temp:  [97.4 °F (36.3 °C)-98.1 °F (36.7 °C)] 98.1 °F (36.7 °C)  Heart Rate:  [] 83  Resp:  [16-18] 18  BP: ()/(61-92) 103/64    MENTAL STATUS EXAM:  Appearance:Casually dressed, good hygeine.   Cooperation:Cooperative  Psychomotor: No psychomotor agitation/retardation, No EPS, No motor tics  Speech-normal rate, amount.  Mood \"better\"   Affect-congruent, appropriate, stable  Thought Content-goal directed, no delusional material present  Thought process-linear, organized.  Suicidality: No SI  Homicidality: No HI  Perception: No AH/VH  Insight-fair   Judgement-fair    Lab Results (last 24 hours)       ** No results found for the last 24 hours. **               Imaging Results (Last 24 Hours)       ** No results found for the last 24 hours. **               ECG/EMG Results (most recent)       None             ALLERGIES: Antihistamines, diphenhydramine-type    Medication Review:   Scheduled Medications:  B-complex with vitamin C, 2 tablet, Oral, Daily  budesonide-formoterol, 2 puff, Inhalation, BID - RT   And  tiotropium bromide monohydrate, 2 puff, Inhalation, Daily - " RT  gabapentin, 200 mg, Oral, BID  LORazepam, 1.5 mg, Oral, 3 times per day   Followed by  [START ON 2025] LORazepam, 1 mg, Oral, 3 times per day   Followed by  [START ON 2025] LORazepam, 0.5 mg, Oral, 3 times per day  metoprolol succinate XL, 12.5 mg, Oral, Q24H  multivitamin with minerals, 1 tablet, Oral, Daily  Naloxegol Oxalate, 25 mg, Oral, Daily  primidone, 75 mg, Oral, Nightly  QUEtiapine XR, 150 mg, Oral, Nightly  thiamine, 100 mg, Oral, Daily         PRN Medications:    aluminum-magnesium hydroxide-simethicone    benzonatate    cyclobenzaprine    famotidine    HYDROcodone-acetaminophen    ibuprofen    loperamide    [] LORazepam **FOLLOWED BY** LORazepam **FOLLOWED BY** [START ON 2025] LORazepam **FOLLOWED BY** [START ON 2025] LORazepam    magnesium hydroxide    ondansetron ODT    polyethylene glycol    sodium chloride    traZODone   All medications reviewed.    ASSESSMENT & PLAN:      Alcohol use disorder, severe, dependence  -Ativan detox, patient has ongoing withdrawal symptoms and will need to complete the detox and is not ready for discharge at this time.   -Thiamine and folate       Depression unspecified    Anxiety unspecified  -Continue quetiapine       HTN (hypertension)  -Metoprolol       Back pain  -Cyclobenzaprine  -Gabapentin       Nicotine use disorder  -Nicotine patch       COPD  -Symbicort  -Spiriva    Special precautions: Special Precautions Level 4 (q30 min checks).    Behavioral Health Treatment Plan and Problem List: I have reviewed and approved the Behavioral Health Treatment Plan and Problem list.  The patient has had a chance to review and agrees with the treatment plan.    Copied text in portions of this note has been reviewed and is accurate as of 25         Clinician:  Sundar Zapien MD  25  13:05 EDT

## 2025-07-11 NOTE — PLAN OF CARE
Goal Outcome Evaluation:  Plan of Care Reviewed With: patient  Patient Agreement with Plan of Care: agrees     Progress: improving  Outcome Evaluation: Pt sleeping for most of day, waking up this afternoon oriented and gait improved. Pt reports feeling better than previous day. Tremor and tachycardia noted, dut pt denies other symptoms, denies SI/HI/AVH. No distress noted.

## 2025-07-11 NOTE — PLAN OF CARE
Goal Outcome Evaluation:  Plan of Care Reviewed With: patient  Plan of Care Reviewed With: patient  Patient Agreement with Plan of Care: agrees     Progress: improving     Pt rates anxiety 3/10, depression 4/10, and cravings 3/10. Appetite is good for shift. Pt excused from group. Given Flexeril and Trazodone prn medications.

## 2025-07-11 NOTE — CONSULTS
Rudolph has a sitter with him as of today and has been sleeping every time I have tried to visit with him. I don't think he is capable of making advanced decisions at this point. Will continue to follow and reassess as necessary.

## 2025-07-12 PROCEDURE — 94799 UNLISTED PULMONARY SVC/PX: CPT

## 2025-07-12 PROCEDURE — 94761 N-INVAS EAR/PLS OXIMETRY MLT: CPT

## 2025-07-12 PROCEDURE — 94760 N-INVAS EAR/PLS OXIMETRY 1: CPT

## 2025-07-12 PROCEDURE — 99232 SBSQ HOSP IP/OBS MODERATE 35: CPT | Performed by: PSYCHIATRY & NEUROLOGY

## 2025-07-12 PROCEDURE — 94664 DEMO&/EVAL PT USE INHALER: CPT

## 2025-07-12 RX ADMIN — GABAPENTIN 200 MG: 100 CAPSULE ORAL at 21:12

## 2025-07-12 RX ADMIN — HYDROCODONE BITARTRATE AND ACETAMINOPHEN 1 TABLET: 7.5; 325 TABLET ORAL at 21:12

## 2025-07-12 RX ADMIN — BUDESONIDE AND FORMOTEROL FUMARATE DIHYDRATE 2 PUFF: 160; 4.5 AEROSOL RESPIRATORY (INHALATION) at 07:41

## 2025-07-12 RX ADMIN — LORAZEPAM 1 MG: 1 TABLET ORAL at 09:19

## 2025-07-12 RX ADMIN — NALOXEGOL OXALATE 25 MG: 25 TABLET, FILM COATED ORAL at 09:20

## 2025-07-12 RX ADMIN — QUETIAPINE FUMARATE 150 MG: 50 TABLET, EXTENDED RELEASE ORAL at 21:13

## 2025-07-12 RX ADMIN — LORAZEPAM 1 MG: 1 TABLET ORAL at 14:42

## 2025-07-12 RX ADMIN — PRIMIDONE 75 MG: 50 TABLET ORAL at 21:13

## 2025-07-12 RX ADMIN — Medication 100 MG: at 09:19

## 2025-07-12 RX ADMIN — TRAZODONE HYDROCHLORIDE 100 MG: 50 TABLET ORAL at 21:12

## 2025-07-12 RX ADMIN — Medication 2 TABLET: at 09:20

## 2025-07-12 RX ADMIN — GABAPENTIN 200 MG: 100 CAPSULE ORAL at 09:19

## 2025-07-12 RX ADMIN — METOPROLOL SUCCINATE 12.5 MG: 25 TABLET, EXTENDED RELEASE ORAL at 09:20

## 2025-07-12 RX ADMIN — CYCLOBENZAPRINE 10 MG: 10 TABLET, FILM COATED ORAL at 23:20

## 2025-07-12 RX ADMIN — IBUPROFEN 400 MG: 400 TABLET ORAL at 23:20

## 2025-07-12 RX ADMIN — Medication 1 TABLET: at 09:19

## 2025-07-12 RX ADMIN — LORAZEPAM 1 MG: 1 TABLET ORAL at 21:12

## 2025-07-12 RX ADMIN — BUDESONIDE AND FORMOTEROL FUMARATE DIHYDRATE 2 PUFF: 160; 4.5 AEROSOL RESPIRATORY (INHALATION) at 19:46

## 2025-07-12 RX ADMIN — TIOTROPIUM BROMIDE INHALATION SPRAY 2 PUFF: 3.12 SPRAY, METERED RESPIRATORY (INHALATION) at 07:41

## 2025-07-12 NOTE — PLAN OF CARE
Goal Outcome Evaluation:  Plan of Care Reviewed With: patient  Patient Agreement with Plan of Care: agrees     Progress: improving  Outcome Evaluation: Pt calm and cooperative this shift. Mild tremor and intermittent tachycardia noted, but pt remports feeling better today. Denies SI/HI/AVH. No distress noted.

## 2025-07-12 NOTE — PLAN OF CARE
Goal Outcome Evaluation:  Plan of Care Reviewed With: patient  Plan of Care Reviewed With: patient  Patient Agreement with Plan of Care: agrees     Progress: improving     Pt rates anxiety 6/10, depression 4/10, and denies cravings. Appetite is good for shift and participated in group. Given Norco prn for neck pain, flexeril, and trazodone. Sitter remains at bedside for safety and patient continues to use walker with ambulation.

## 2025-07-12 NOTE — PROGRESS NOTES
"    Detox Recovery Unit Progress Note   Clinician: James Lynn MD  Admission Date: 7/9/2025  10:39 EDT 07/12/25    Behavioral Health Treatment Plan and Problem List: I have reviewed and approved the Behavioral Health Treatment Plan and Problem list.    Allergies  Allergies   Allergen Reactions    Antihistamines, Diphenhydramine-Type Urinary Retention       Hospital Day: 3 days      Assessment completed within view of staff    History  CC: withdrawal symptoms    Interval HPI: Patient seen and evaluated by me.  Chart reviewed. Patient is withdrawing from alcohol  Current withdrawal symptoms include: tremor      Review of Systems   Constitutional: Negative.    HENT: Negative.     Eyes: Negative.    Respiratory: Negative.     Cardiovascular: Negative.    Gastrointestinal: Negative.    Endocrine: Negative.    Genitourinary: Negative.    Musculoskeletal: Negative.    Skin: Negative.    Allergic/Immunologic: Negative.    Neurological:  Positive for tremors.   Hematological: Negative.    All other systems reviewed and are negative.       /73 (BP Location: Left arm, Patient Position: Lying)   Pulse 78   Temp 96.8 °F (36 °C) (Temporal)   Resp 16   Ht 180.3 cm (71\")   Wt 66.7 kg (147 lb)   SpO2 95%   BMI 20.50 kg/m²     Mental Status Exam  Mood: dysphoric  Affect: mood congruent  Thought Processes: linear  Oriented X3:  yes  Thought Content: sensorium intact   Suicidal Thoughts: denies  Homicidal Thoughts: denies        Medical Decision Making:   Labs:     Lab Results (last 24 hours)       ** No results found for the last 24 hours. **              Radiology:     Imaging Results (Last 24 Hours)       ** No results found for the last 24 hours. **              EKG:     ECG/EMG Results (most recent)       None             Medications:  B-complex with vitamin C, 2 tablet, Oral, Daily  budesonide-formoterol, 2 puff, Inhalation, BID - RT   And  tiotropium bromide monohydrate, 2 puff, Inhalation, Daily - " RT  gabapentin, 200 mg, Oral, BID  LORazepam, 1 mg, Oral, 3 times per day   Followed by  [START ON 7/13/2025] LORazepam, 0.5 mg, Oral, 3 times per day  metoprolol succinate XL, 12.5 mg, Oral, Q24H  multivitamin with minerals, 1 tablet, Oral, Daily  Naloxegol Oxalate, 25 mg, Oral, Daily  primidone, 75 mg, Oral, Nightly  QUEtiapine XR, 150 mg, Oral, Nightly  thiamine, 100 mg, Oral, Daily           All medications reviewed.      Assessment and Plan:   Alcohol use disorder, severe, dependence  Alcohol withdrawal  -Continue ativan detox   -Thiamine and folate       Depression unspecified    Anxiety unspecified  -Continue quetiapine       HTN (hypertension)  -Metoprolol       Back pain  -Cyclobenzaprine  -Gabapentin       Nicotine use disorder  -Nicotine patch       COPD  -Symbicort  -Spiriva      Continue hospitalization for medical management of drug withdrawal and patient safety and stabilization.  Continue individual and group chemical dependency counseling.   Therapist and treatment team will continue to assist patient in establishing plans for follow-up and rehabilitation that will serve as the next step in care once patient has completed the medical detox.    I, James Lynn MD, I have completed an encounter with the patient today, provided ongoing monitoring and/or adjustments to the assessment and plan described and documented above, and believe this information to be both accurate and complete as of 7/12/2025

## 2025-07-13 PROCEDURE — 63710000001 ONDANSETRON ODT 4 MG TABLET DISPERSIBLE: Performed by: PSYCHIATRY & NEUROLOGY

## 2025-07-13 PROCEDURE — 94799 UNLISTED PULMONARY SVC/PX: CPT

## 2025-07-13 PROCEDURE — 94664 DEMO&/EVAL PT USE INHALER: CPT

## 2025-07-13 PROCEDURE — 99232 SBSQ HOSP IP/OBS MODERATE 35: CPT | Performed by: PSYCHIATRY & NEUROLOGY

## 2025-07-13 RX ORDER — ALBUTEROL SULFATE 90 UG/1
2 INHALANT RESPIRATORY (INHALATION) EVERY 4 HOURS PRN
Status: DISCONTINUED | OUTPATIENT
Start: 2025-07-13 | End: 2025-07-14 | Stop reason: HOSPADM

## 2025-07-13 RX ORDER — QUETIAPINE FUMARATE 100 MG/1
50 TABLET, FILM COATED ORAL ONCE
Status: COMPLETED | OUTPATIENT
Start: 2025-07-13 | End: 2025-07-13

## 2025-07-13 RX ADMIN — Medication 2 TABLET: at 08:57

## 2025-07-13 RX ADMIN — PRIMIDONE 75 MG: 50 TABLET ORAL at 21:14

## 2025-07-13 RX ADMIN — QUETIAPINE FUMARATE 50 MG: 100 TABLET ORAL at 02:31

## 2025-07-13 RX ADMIN — CYCLOBENZAPRINE 10 MG: 10 TABLET, FILM COATED ORAL at 17:03

## 2025-07-13 RX ADMIN — QUETIAPINE FUMARATE 150 MG: 50 TABLET, EXTENDED RELEASE ORAL at 21:14

## 2025-07-13 RX ADMIN — ONDANSETRON 4 MG: 4 TABLET, ORALLY DISINTEGRATING ORAL at 13:55

## 2025-07-13 RX ADMIN — TIOTROPIUM BROMIDE INHALATION SPRAY 2 PUFF: 3.12 SPRAY, METERED RESPIRATORY (INHALATION) at 07:11

## 2025-07-13 RX ADMIN — BUDESONIDE AND FORMOTEROL FUMARATE DIHYDRATE 2 PUFF: 160; 4.5 AEROSOL RESPIRATORY (INHALATION) at 07:11

## 2025-07-13 RX ADMIN — GABAPENTIN 200 MG: 100 CAPSULE ORAL at 08:57

## 2025-07-13 RX ADMIN — HYDROCODONE BITARTRATE AND ACETAMINOPHEN 1 TABLET: 7.5; 325 TABLET ORAL at 14:57

## 2025-07-13 RX ADMIN — METOPROLOL SUCCINATE 12.5 MG: 25 TABLET, EXTENDED RELEASE ORAL at 08:57

## 2025-07-13 RX ADMIN — LORAZEPAM 0.5 MG: 0.5 TABLET ORAL at 14:56

## 2025-07-13 RX ADMIN — LORAZEPAM 0.5 MG: 0.5 TABLET ORAL at 21:14

## 2025-07-13 RX ADMIN — TRAZODONE HYDROCHLORIDE 100 MG: 50 TABLET ORAL at 21:14

## 2025-07-13 RX ADMIN — BUDESONIDE AND FORMOTEROL FUMARATE DIHYDRATE 2 PUFF: 160; 4.5 AEROSOL RESPIRATORY (INHALATION) at 18:52

## 2025-07-13 RX ADMIN — Medication 100 MG: at 08:57

## 2025-07-13 RX ADMIN — LORAZEPAM 0.5 MG: 0.5 TABLET ORAL at 09:05

## 2025-07-13 RX ADMIN — IBUPROFEN 400 MG: 400 TABLET ORAL at 17:03

## 2025-07-13 RX ADMIN — GABAPENTIN 200 MG: 100 CAPSULE ORAL at 21:14

## 2025-07-13 RX ADMIN — NALOXEGOL OXALATE 25 MG: 25 TABLET, FILM COATED ORAL at 08:57

## 2025-07-13 RX ADMIN — Medication 1 TABLET: at 08:57

## 2025-07-13 NOTE — PLAN OF CARE
Goal Outcome Evaluation:  Plan of Care Reviewed With: patient  Plan of Care Reviewed With: patient  Patient Agreement with Plan of Care: agrees     Progress: improving  Outcome Evaluation: Patient is calm and cooperative with staff this shift. Rated anxiety 4/10, depression 7/10, cravings 0/10. Denies SI/HI/AVH. Mild tremor and tachycardia noted. Complaints of tremors. No distress noted.

## 2025-07-13 NOTE — PLAN OF CARE
Goal Outcome Evaluation:  Plan of Care Reviewed With: patient  Patient Agreement with Plan of Care: agrees     Progress: improving  Outcome Evaluation: Pt calm and cooperative this shift. Interacting well with peers and staff. Denies wd symptoms. Denies SI/HI/AVH. No distress noted.

## 2025-07-13 NOTE — NURSING NOTE
Pt asking to leave because having difficulty sleeping. Discussed 150 seroquel, 100 trazadone, pain pill, flexeril that pt had taken @2112; pt still dissatisfied with medications. Contacted Dr. CHER Lynn; new orders for 50 mg seroquel once TORBV.

## 2025-07-13 NOTE — PROGRESS NOTES
"    Detox Recovery Unit Progress Note   Clinician: James Lynn MD  Admission Date: 7/9/2025  10:27 EDT 07/13/25    Behavioral Health Treatment Plan and Problem List: I have reviewed and approved the Behavioral Health Treatment Plan and Problem list.    Allergies  Allergies   Allergen Reactions    Antihistamines, Diphenhydramine-Type Urinary Retention       Hospital Day: 4 days      Assessment completed within view of staff    History  CC: withdrawal symptoms    Interval HPI: Patient seen and evaluated by me.  Chart reviewed. Patient is withdrawing from alcohol  Current withdrawal symptoms include: fatigue.  Detox medications helping significantly with the withdrawal Sx.   He c/o persisting difficulty with insomnia last night. Provided a one time dose of Seroquel IR 50mg.      Review of Systems   Constitutional:  Positive for fatigue.   HENT: Negative.     Eyes: Negative.    Respiratory: Negative.     Cardiovascular: Negative.    Gastrointestinal: Negative.    Endocrine: Negative.    Genitourinary: Negative.    Musculoskeletal: Negative.    Skin: Negative.    Allergic/Immunologic: Negative.    Neurological: Negative.    Hematological: Negative.    All other systems reviewed and are negative.       /80 (BP Location: Right arm, Patient Position: Lying) Comment: Pt refused standing B/P  Pulse 97   Temp 98.1 °F (36.7 °C) (Temporal)   Resp 16   Ht 180.3 cm (71\")   Wt 66.7 kg (147 lb)   SpO2 95%   BMI 20.50 kg/m²     Mental Status Exam  Mood: normal  Affect: mood congruent  Thought Processes: linear  Oriented X3:  yes  Thought Content: sensorium intact   Suicidal Thoughts: denies  Homicidal Thoughts: denies        Medical Decision Making:   Labs:     Lab Results (last 24 hours)       ** No results found for the last 24 hours. **              Radiology:     Imaging Results (Last 24 Hours)       ** No results found for the last 24 hours. **              EKG:     ECG/EMG Results (most recent)       None      "        Medications:  B-complex with vitamin C, 2 tablet, Oral, Daily  budesonide-formoterol, 2 puff, Inhalation, BID - RT   And  tiotropium bromide monohydrate, 2 puff, Inhalation, Daily - RT  gabapentin, 200 mg, Oral, BID  LORazepam, 0.5 mg, Oral, 3 times per day  metoprolol succinate XL, 12.5 mg, Oral, Q24H  multivitamin with minerals, 1 tablet, Oral, Daily  Naloxegol Oxalate, 25 mg, Oral, Daily  primidone, 75 mg, Oral, Nightly  QUEtiapine XR, 150 mg, Oral, Nightly  thiamine, 100 mg, Oral, Daily           All medications reviewed.      Assessment and Plan:   Alcohol use disorder, severe, dependence  Alcohol withdrawal  -Continue ativan detox   -Thiamine and folate       Depression unspecified    Anxiety unspecified  -Continue quetiapine       HTN (hypertension)  -Metoprolol       Back pain  -Cyclobenzaprine  -Gabapentin       Nicotine use disorder  -Nicotine patch       COPD  -Symbicort  -Spiriva      Continue hospitalization for medical management of drug withdrawal and patient safety and stabilization.  Continue individual and group chemical dependency counseling.   Therapist and treatment team will continue to assist patient in establishing plans for follow-up and rehabilitation that will serve as the next step in care once patient has completed the medical detox.    I, James Lynn MD, I have completed an encounter with the patient today, provided ongoing monitoring and/or adjustments to the assessment and plan described and documented above, and believe this information to be both accurate and complete as of 7/13/2025

## 2025-07-14 VITALS
HEIGHT: 71 IN | OXYGEN SATURATION: 97 % | DIASTOLIC BLOOD PRESSURE: 92 MMHG | BODY MASS INDEX: 20.58 KG/M2 | HEART RATE: 90 BPM | SYSTOLIC BLOOD PRESSURE: 138 MMHG | TEMPERATURE: 98.4 F | RESPIRATION RATE: 18 BRPM | WEIGHT: 147 LBS

## 2025-07-14 PROCEDURE — 94799 UNLISTED PULMONARY SVC/PX: CPT

## 2025-07-14 PROCEDURE — 63710000001 ONDANSETRON ODT 4 MG TABLET DISPERSIBLE: Performed by: PSYCHIATRY & NEUROLOGY

## 2025-07-14 PROCEDURE — 94664 DEMO&/EVAL PT USE INHALER: CPT

## 2025-07-14 PROCEDURE — 99239 HOSP IP/OBS DSCHRG MGMT >30: CPT | Performed by: PSYCHIATRY & NEUROLOGY

## 2025-07-14 RX ADMIN — BUDESONIDE AND FORMOTEROL FUMARATE DIHYDRATE 2 PUFF: 160; 4.5 AEROSOL RESPIRATORY (INHALATION) at 07:26

## 2025-07-14 RX ADMIN — METOPROLOL SUCCINATE 12.5 MG: 25 TABLET, EXTENDED RELEASE ORAL at 08:14

## 2025-07-14 RX ADMIN — GABAPENTIN 200 MG: 100 CAPSULE ORAL at 08:14

## 2025-07-14 RX ADMIN — NALOXEGOL OXALATE 25 MG: 25 TABLET, FILM COATED ORAL at 08:14

## 2025-07-14 RX ADMIN — IBUPROFEN 400 MG: 400 TABLET ORAL at 10:49

## 2025-07-14 RX ADMIN — Medication 100 MG: at 08:14

## 2025-07-14 RX ADMIN — SALINE NASAL SPRAY 2 SPRAY: 1.5 SOLUTION NASAL at 08:13

## 2025-07-14 RX ADMIN — Medication 2 TABLET: at 08:14

## 2025-07-14 RX ADMIN — ONDANSETRON 4 MG: 4 TABLET, ORALLY DISINTEGRATING ORAL at 15:32

## 2025-07-14 RX ADMIN — CYCLOBENZAPRINE 10 MG: 10 TABLET, FILM COATED ORAL at 15:32

## 2025-07-14 RX ADMIN — Medication 1 TABLET: at 08:14

## 2025-07-14 RX ADMIN — HYDROCODONE BITARTRATE AND ACETAMINOPHEN 1 TABLET: 7.5; 325 TABLET ORAL at 08:14

## 2025-07-14 NOTE — DISCHARGE SUMMARY
":  1960  MRN:  [id number]  Visit Number:  [id number]      Date of Admission:2025   Date of Discharge:  2025    Discharge Diagnosis:  Principal Problem:    Alcohol use disorder, severe, dependence  Active Problems:    GERD (gastroesophageal reflux disease)    Depression    Anxiety    HTN (hypertension)        Admission Diagnosis:  Alcohol dependence [F10.20]     BRUNO Joaquin is a 65 y.o. male who was admitted on 2025 with complaints of alcohol use and withdrawals.   For details please see H&P dated 25.     Hospital Course  Patient is a 65 y.o. male presented with alcohol use and withdrawals. The patient was admitted to the [facility] detox recovery unit for safety, further evaluation and treatment.  The patient was started on Ativan detox. Tpe patient was very weak, tremulous initially and needed 1:1 monitoring to prevent falls. The patient was able to complete the Ativan detox without any adverse events and by the end of the detox he reported feeling better and was not unsteady on his feet.   The patient was continued on his home medications. At the time of discharge he reported ongoing depression and wanted to take fluoxetine as it had helped him in the past. He was started on fluoxetine 20 mg daily.   The patient was also able to take part in individual and group counseling sessions and work on appropriate coping skills.  The patient made steady improvement in his withdrawals and mood and expressed feeling more positive and hopeful about future. Sleep and appetite were improved.  The day of discharge the patient was calm, cooperative and pleasant. Mood was reported to be good, and denied SI/HI/AVH. Also reported no medication side effects.        Mental Status Exam upon discharge:   Mood \"good\"   Affect-congruent, appropriate, stable  Thought Content-goal directed, no delusional material present  Thought process-linear, organized.  Suicidality: No SI  Homicidality: No " HI  Perception: No /    Procedures Performed         Consults:   Consults       No orders found from 6/10/2025 to 7/10/2025.            Pertinent Test Results:   Admission on 07/09/2025   Component Date Value Ref Range Status    Glucose 07/09/2025 100 (H)  65 - 99 mg/dL Final    BUN 07/09/2025 6.4 (L)  8.0 - 23.0 mg/dL Final    Creatinine 07/09/2025 0.81  0.76 - 1.27 mg/dL Final    Sodium 07/09/2025 138  136 - 145 mmol/L Final    Potassium 07/09/2025 4.2  3.5 - 5.2 mmol/L Final    Chloride 07/09/2025 100  98 - 107 mmol/L Final    CO2 07/09/2025 28.7  22.0 - 29.0 mmol/L Final    Calcium 07/09/2025 8.7  8.6 - 10.5 mg/dL Final    Total Protein 07/09/2025 6.0  6.0 - 8.5 g/dL Final    Albumin 07/09/2025 3.9  3.5 - 5.2 g/dL Final    ALT (SGPT) 07/09/2025 28  1 - 41 U/L Final    AST (SGOT) 07/09/2025 49 (H)  1 - 40 U/L Final    Alkaline Phosphatase 07/09/2025 77  39 - 117 U/L Final    Total Bilirubin 07/09/2025 1.3 (H)  0.0 - 1.2 mg/dL Final    Globulin 07/09/2025 2.1  gm/dL Final    A/G Ratio 07/09/2025 1.9  g/dL Final    BUN/Creatinine Ratio 07/09/2025 7.9  7.0 - 25.0 Final    Anion Gap 07/09/2025 9.3  5.0 - 15.0 mmol/L Final    eGFR 07/09/2025 96.6  >60.0 mL/min/1.73 Final    Hepatitis B Surface Ag 07/09/2025 Non-Reactive  Non-Reactive Final    Hep A IgM 07/09/2025 Non-Reactive  Non-Reactive Final    Hep B C IgM 07/09/2025 Non-Reactive  Non-Reactive Final    Hepatitis C Ab 07/09/2025 Non-Reactive  Non-Reactive Final   Admission on 07/08/2025, Discharged on 07/09/2025   Component Date Value Ref Range Status    Glucose 07/08/2025 104 (H)  65 - 99 mg/dL Final    BUN 07/08/2025 6.2 (L)  8.0 - 23.0 mg/dL Final    Creatinine 07/08/2025 0.78  0.76 - 1.27 mg/dL Final    Sodium 07/08/2025 138  136 - 145 mmol/L Final    Potassium 07/08/2025 4.0  3.5 - 5.2 mmol/L Final    Chloride 07/08/2025 97 (L)  98 - 107 mmol/L Final    CO2 07/08/2025 26.0  22.0 - 29.0 mmol/L Final    Calcium 07/08/2025 8.9  8.6 - 10.5 mg/dL Final    Total  Protein 07/08/2025 7.1  6.0 - 8.5 g/dL Final    Albumin 07/08/2025 4.3  3.5 - 5.2 g/dL Final    ALT (SGPT) 07/08/2025 29  1 - 41 U/L Final    AST (SGOT) 07/08/2025 51 (H)  1 - 40 U/L Final    Alkaline Phosphatase 07/08/2025 91  39 - 117 U/L Final    Total Bilirubin 07/08/2025 0.7  0.0 - 1.2 mg/dL Final    Globulin 07/08/2025 2.8  gm/dL Final    A/G Ratio 07/08/2025 1.5  g/dL Final    BUN/Creatinine Ratio 07/08/2025 7.9  7.0 - 25.0 Final    Anion Gap 07/08/2025 15.0  5.0 - 15.0 mmol/L Final    eGFR 07/08/2025 97.7  >60.0 mL/min/1.73 Final    Color, UA 07/08/2025 Yellow  Yellow, Straw Final    Appearance, UA 07/08/2025 Clear  Clear Final    pH, UA 07/08/2025 7.5  5.0 - 8.0 Final    Specific Gravity, UA 07/08/2025 <=1.005  1.005 - 1.030 Final    Glucose, UA 07/08/2025 Negative  Negative Final    Ketones, UA 07/08/2025 Negative  Negative Final    Bilirubin, UA 07/08/2025 Negative  Negative Final    Blood, UA 07/08/2025 Negative  Negative Final    Protein, UA 07/08/2025 Negative  Negative Final    Leuk Esterase, UA 07/08/2025 Negative  Negative Final    Nitrite, UA 07/08/2025 Negative  Negative Final    Urobilinogen, UA 07/08/2025 1.0 E.U./dL  0.2 - 1.0 E.U./dL Final    Magnesium 07/08/2025 2.1  1.6 - 2.4 mg/dL Final    THC, Screen, Urine 07/08/2025 Negative  Negative Final    Phencyclidine (PCP), Urine 07/08/2025 Negative  Negative Final    Cocaine Screen, Urine 07/08/2025 Negative  Negative Final    Methamphetamine, Ur 07/08/2025 Negative  Negative Final    Opiate Screen 07/08/2025 Positive (A)  Negative Final    Amphetamine Screen, Urine 07/08/2025 Negative  Negative Final    Benzodiazepine Screen, Urine 07/08/2025 Negative  Negative Final    Tricyclic Antidepressants Screen 07/08/2025 Negative  Negative Final    Methadone Screen, Urine 07/08/2025 Negative  Negative Final    Barbiturates Screen, Urine 07/08/2025 Negative  Negative Final    Oxycodone Screen, Urine 07/08/2025 Negative  Negative Final    Buprenorphine,  Screen, Urine 07/08/2025 Negative  Negative Final    Ethanol 07/08/2025 324 (H)  0 - 10 mg/dL Final    Ethanol % 07/08/2025 0.324  % Final    WBC 07/08/2025 5.20  3.40 - 10.80 10*3/mm3 Final    RBC 07/08/2025 4.35  4.14 - 5.80 10*6/mm3 Final    Hemoglobin 07/08/2025 14.3  13.0 - 17.7 g/dL Final    Hematocrit 07/08/2025 42.8  37.5 - 51.0 % Final    MCV 07/08/2025 98.4 (H)  79.0 - 97.0 fL Final    MCH 07/08/2025 32.9  26.6 - 33.0 pg Final    MCHC 07/08/2025 33.4  31.5 - 35.7 g/dL Final    RDW 07/08/2025 12.7  12.3 - 15.4 % Final    RDW-SD 07/08/2025 45.8  37.0 - 54.0 fl Final    MPV 07/08/2025 9.6  6.0 - 12.0 fL Final    Platelets 07/08/2025 248  140 - 450 10*3/mm3 Final    Neutrophil % 07/08/2025 47.9  42.7 - 76.0 % Final    Lymphocyte % 07/08/2025 43.8  19.6 - 45.3 % Final    Monocyte % 07/08/2025 6.7  5.0 - 12.0 % Final    Eosinophil % 07/08/2025 0.4  0.3 - 6.2 % Final    Basophil % 07/08/2025 0.8  0.0 - 1.5 % Final    Immature Grans % 07/08/2025 0.4  0.0 - 0.5 % Final    Neutrophils, Absolute 07/08/2025 2.49  1.70 - 7.00 10*3/mm3 Final    Lymphocytes, Absolute 07/08/2025 2.28  0.70 - 3.10 10*3/mm3 Final    Monocytes, Absolute 07/08/2025 0.35  0.10 - 0.90 10*3/mm3 Final    Eosinophils, Absolute 07/08/2025 0.02  0.00 - 0.40 10*3/mm3 Final    Basophils, Absolute 07/08/2025 0.04  0.00 - 0.20 10*3/mm3 Final    Immature Grans, Absolute 07/08/2025 0.02  0.00 - 0.05 10*3/mm3 Final    nRBC 07/08/2025 0.0  0.0 - 0.2 /100 WBC Final    Fentanyl, Urine 07/08/2025 Negative  Negative Final    Ethanol 07/09/2025 90 (H)  0 - 10 mg/dL Final    Ethanol % 07/09/2025 0.090  % Final    QT Interval 07/09/2025 384  ms Final    QTC Interval 07/09/2025 464  ms Final        Condition on Discharge:  improved    Vital Signs  Temp:  [97.4 °F (36.3 °C)-98.6 °F (37 °C)] 97.4 °F (36.3 °C)  Heart Rate:  [] 90  Resp:  [16-18] 18  BP: (102-165)/(66-99) 142/90      Discharge Disposition:  Home or Self Care    Discharge Medications:      Discharge Medications        New Medications        Instructions Start Date   FLUoxetine 20 MG capsule  Commonly known as: PROzac   20 mg, Oral, Daily             Continue These Medications        Instructions Start Date   cyclobenzaprine 10 MG tablet  Commonly known as: FLEXERIL   10 mg, Oral, 2 Times Daily PRN      famotidine 20 MG tablet  Commonly known as: PEPCID   20 mg, Oral, Daily      gabapentin 100 MG capsule  Commonly known as: NEURONTIN   200 mg, Oral, 2 Times Daily      HYDROcodone-acetaminophen 7.5-325 MG per tablet  Commonly known as: NORCO   1 tablet, Oral, Every 12 Hours PRN      metoprolol tartrate 25 MG tablet  Commonly known as: LOPRESSOR   12.5 mg, Daily      Naloxegol Oxalate 25 MG tablet  Commonly known as: MOVANTIK   25 mg, Oral, Daily PRN      ondansetron 4 MG tablet  Commonly known as: ZOFRAN   4 mg, Oral, Every 12 Hours PRN      primidone 50 MG tablet  Commonly known as: MYSOLINE   75 mg, Oral, Nightly      QUEtiapine  MG 24 hr tablet  Commonly known as: SEROquel XR   150 mg, Oral, Nightly PRN      traZODone 100 MG tablet  Commonly known as: DESYREL   100 mg, Oral, Nightly PRN      Trelegy Ellipta 100-62.5-25 MCG/ACT inhaler  Generic drug: Fluticasone-Umeclidin-Vilant   1 puff, Daily - RT             Stop These Medications      methocarbamol 750 MG tablet  Commonly known as: ROBAXIN     promethazine 25 MG tablet  Commonly known as: PHENERGAN              Discharge Diet: Regular     Activity at Discharge: As tolerated     Follow-up Appointments  [facility]    Time: I spent  > 30  minutes on this discharge activity which included: face-to-face encounter with the patient, reviewing the data in the system, coordination of the care with the nursing staff as well as consultants, documentation, and entering orders.        Clinician:   Sundar Zapien MD  07/14/25  13:25 EDT

## 2025-07-14 NOTE — PLAN OF CARE
Goal Outcome Evaluation:  Plan of Care Reviewed With: patient  Plan of Care Reviewed With: patient  Patient Agreement with Plan of Care: agrees        Outcome Evaluation: Pt calm and cooperative.  Reports feeling discouraged with dealling with chronic pain r/t bulging discs.  Denies SI/HI/AVH.  Rates craving 1/10 and c/o mild tremor.  No distress noted.

## 2025-07-14 NOTE — CASE MANAGEMENT/SOCIAL WORK
Patient Name:  Rudolph Joaquin  YOB: 1960  MRN: [id number]  Admit Date:  7/9/2025    This therapist is covering patient for primary therapist. Patient expected to discharge home on this date, friend Jason to provide transportation. Patient reports improvement in mood and withdrawal symptoms, denies SI/HI/AVH. Patient plans to attend [facility] meetings and has not been agreeable to additional aftercare. Healthy coping skills, relapse prevention, and safety planning have been reviewed. Patient has been assisted with identifying risk factor which would indicate the need for higher level of care including thoughts to harm self or others and/or self-harming behavior. Encouraged patient to call [phone] or present to the nearest emergency room should any of these events occur. Patient adamantly and convincingly denies suicidal and homicidal ideation or perceptual disturbance on day of discharge. This therapist completed [facility] Safety Plan. No other needs identified.     Electronically signed by:  HAILEY Green  07/14/25 09:26 EDT

## 2025-07-14 NOTE — PLAN OF CARE
Goal Outcome Evaluation:  Plan of Care Reviewed With: patient  Patient Agreement with Plan of Care: agrees     Progress: improving

## 2025-07-15 NOTE — PAYOR COMM NOTE
"Rudolph Jackson (65 y.o. Male)       Date of Birth   1960    Social Security Number       Address   [address] [place] [address]    Home Phone   [phone]    MRN   [id number]       Evangelical   [employer]    Marital Status                               Admission Date   2025    Admission Type   Emergency    Admitting Provider   James Lynn MD    Attending Provider       Department, Room/Bed   [facility] ADULT CD, 1046/2S       Discharge Date   2025    Discharge Disposition   Home or Self Care    Discharge Destination                                 Attending Provider: (none)   Allergies: Antihistamines, Diphenhydramine-type    Isolation: None   Infection: None   Code Status: Prior    Ht: 180.3 cm (71\")   Wt: 66.7 kg (147 lb)    Admission Cmt: None   Principal Problem: Alcohol use disorder, severe, dependence [F10.20]                   Active Insurance as of 2025       Primary Coverage       Payor Plan Insurance Group Employer/Plan Group    [employer] MEDICARE REPLACEMENT [employer] PPO [id number]       Payor Plan Address Payor Plan Phone Number Payor Plan Fax Number Effective Dates    [address] [address] [phone]  2019 - None Entered    [facility] [address]         Subscriber Name Subscriber Birth Date Member ID       RUDOLPH JACKSON 1960 [id number]                     Emergency Contacts        (Rel.) Home Phone Work Phone Mobile Phone    RONNELL HERNANDEZ (Friend) -- -- [phone]            PLEASE ATTACH THE INCLUDED DISCHARGE INFORMATION TO AUTHORIZATION NUMBER [id number]     RETURN FAX NUMBER [phone]     PATIENT NAME:  RUDOLPH JACKSON  :  1960  [employer] MEDICARE ID:  [id number]    ADMISSION DATE:  2025  DISCHARGE DATE:  2025    FACILITY:  [facility]  NPI:  [id number]  TAX ID:  [id number]  ADDRESS:  [address] [place] [address] [place]  [address]     ATTENDING MD:  DR. ALEX GUTIÉRREZ  NPI:  " [id number]  ADDRESS:  SAME AS FACILITY     UR CONTACT:  ANA JONES RN  PHONE:  [phone]  FAX:  [phone]        PRIMARY DIAGNOSIS AT DISCHARGE:  F10.20 - ALCOHOL USE DISORDER, SEVERE, DEPENDENCE        THERAPY NOTE DATED 2025 REGARDING AFTER CARE IS COPIED BELOW:      Josy Malik CSW   Therapist  Psychiatry     Case Management/Social Work     Signed     Date of Service: 25  Creation Time: 25     Signed         Patient Name:  Rudolph Joaquin  YOB: 1960  MRN: [id number]  Admit Date:  2025     This therapist is covering patient for primary therapist. Patient expected to discharge home on this date, friend Jason to provide transportation. Patient reports improvement in mood and withdrawal symptoms, denies SI/HI/AVH. Patient plans to attend [facility] meetings and has not been agreeable to additional aftercare. Healthy coping skills, relapse prevention, and safety planning have been reviewed. Patient has been assisted with identifying risk factor which would indicate the need for higher level of care including thoughts to harm self or others and/or self-harming behavior. Encouraged patient to call [phone] or present to the nearest emergency room should any of these events occur. Patient adamantly and convincingly denies suicidal and homicidal ideation or perceptual disturbance on day of discharge. This therapist completed [facility] Safety Plan. No other needs identified.      Electronically signed by:  HAILEY Green  25 09:26 EDT                            Discharge Summary        Sundar Zapien MD at 25 1325          :  1960  MRN:  [id number]  Visit Number:  [id number]      Date of Admission:2025   Date of Discharge:  2025    Discharge Diagnosis:  Principal Problem:    Alcohol use disorder, severe, dependence  Active Problems:    GERD (gastroesophageal reflux disease)    Depression    Anxiety    HTN  "(hypertension)        Admission Diagnosis:  Alcohol dependence [F10.20]     HPI  Rudolph Joaquin is a 65 y.o. male who was admitted on 7/9/2025 with complaints of alcohol use and withdrawals.   For details please see H&P dated 7/9/25.     Hospital Course  Patient is a 65 y.o. male presented with alcohol use and withdrawals. The patient was admitted to the [facility] detox recovery unit for safety, further evaluation and treatment.  The patient was started on Ativan detox. Tpe patient was very weak, tremulous initially and needed 1:1 monitoring to prevent falls. The patient was able to complete the Ativan detox without any adverse events and by the end of the detox he reported feeling better and was not unsteady on his feet.   The patient was continued on his home medications. At the time of discharge he reported ongoing depression and wanted to take fluoxetine as it had helped him in the past. He was started on fluoxetine 20 mg daily.   The patient was also able to take part in individual and group counseling sessions and work on appropriate coping skills.  The patient made steady improvement in his withdrawals and mood and expressed feeling more positive and hopeful about future. Sleep and appetite were improved.  The day of discharge the patient was calm, cooperative and pleasant. Mood was reported to be good, and denied SI/HI/AVH. Also reported no medication side effects.        Mental Status Exam upon discharge:   Mood \"good\"   Affect-congruent, appropriate, stable  Thought Content-goal directed, no delusional material present  Thought process-linear, organized.  Suicidality: No SI  Homicidality: No HI  Perception: No AH/    Procedures Performed         Consults:   Consults       No orders found from 6/10/2025 to 7/10/2025.            Pertinent Test Results:   Admission on 07/09/2025   Component Date Value Ref Range Status    Glucose 07/09/2025 100 (H)  65 - 99 mg/dL Final    BUN 07/09/2025 6.4 (L)  8.0 - " 23.0 mg/dL Final    Creatinine 07/09/2025 0.81  0.76 - 1.27 mg/dL Final    Sodium 07/09/2025 138  136 - 145 mmol/L Final    Potassium 07/09/2025 4.2  3.5 - 5.2 mmol/L Final    Chloride 07/09/2025 100  98 - 107 mmol/L Final    CO2 07/09/2025 28.7  22.0 - 29.0 mmol/L Final    Calcium 07/09/2025 8.7  8.6 - 10.5 mg/dL Final    Total Protein 07/09/2025 6.0  6.0 - 8.5 g/dL Final    Albumin 07/09/2025 3.9  3.5 - 5.2 g/dL Final    ALT (SGPT) 07/09/2025 28  1 - 41 U/L Final    AST (SGOT) 07/09/2025 49 (H)  1 - 40 U/L Final    Alkaline Phosphatase 07/09/2025 77  39 - 117 U/L Final    Total Bilirubin 07/09/2025 1.3 (H)  0.0 - 1.2 mg/dL Final    Globulin 07/09/2025 2.1  gm/dL Final    A/G Ratio 07/09/2025 1.9  g/dL Final    BUN/Creatinine Ratio 07/09/2025 7.9  7.0 - 25.0 Final    Anion Gap 07/09/2025 9.3  5.0 - 15.0 mmol/L Final    eGFR 07/09/2025 96.6  >60.0 mL/min/1.73 Final    Hepatitis B Surface Ag 07/09/2025 Non-Reactive  Non-Reactive Final    Hep A IgM 07/09/2025 Non-Reactive  Non-Reactive Final    Hep B C IgM 07/09/2025 Non-Reactive  Non-Reactive Final    Hepatitis C Ab 07/09/2025 Non-Reactive  Non-Reactive Final   Admission on 07/08/2025, Discharged on 07/09/2025   Component Date Value Ref Range Status    Glucose 07/08/2025 104 (H)  65 - 99 mg/dL Final    BUN 07/08/2025 6.2 (L)  8.0 - 23.0 mg/dL Final    Creatinine 07/08/2025 0.78  0.76 - 1.27 mg/dL Final    Sodium 07/08/2025 138  136 - 145 mmol/L Final    Potassium 07/08/2025 4.0  3.5 - 5.2 mmol/L Final    Chloride 07/08/2025 97 (L)  98 - 107 mmol/L Final    CO2 07/08/2025 26.0  22.0 - 29.0 mmol/L Final    Calcium 07/08/2025 8.9  8.6 - 10.5 mg/dL Final    Total Protein 07/08/2025 7.1  6.0 - 8.5 g/dL Final    Albumin 07/08/2025 4.3  3.5 - 5.2 g/dL Final    ALT (SGPT) 07/08/2025 29  1 - 41 U/L Final    AST (SGOT) 07/08/2025 51 (H)  1 - 40 U/L Final    Alkaline Phosphatase 07/08/2025 91  39 - 117 U/L Final    Total Bilirubin 07/08/2025 0.7  0.0 - 1.2 mg/dL Final     Globulin 07/08/2025 2.8  gm/dL Final    A/G Ratio 07/08/2025 1.5  g/dL Final    BUN/Creatinine Ratio 07/08/2025 7.9  7.0 - 25.0 Final    Anion Gap 07/08/2025 15.0  5.0 - 15.0 mmol/L Final    eGFR 07/08/2025 97.7  >60.0 mL/min/1.73 Final    Color, UA 07/08/2025 Yellow  Yellow, Straw Final    Appearance, UA 07/08/2025 Clear  Clear Final    pH, UA 07/08/2025 7.5  5.0 - 8.0 Final    Specific Gravity, UA 07/08/2025 <=1.005  1.005 - 1.030 Final    Glucose, UA 07/08/2025 Negative  Negative Final    Ketones, UA 07/08/2025 Negative  Negative Final    Bilirubin, UA 07/08/2025 Negative  Negative Final    Blood, UA 07/08/2025 Negative  Negative Final    Protein, UA 07/08/2025 Negative  Negative Final    Leuk Esterase, UA 07/08/2025 Negative  Negative Final    Nitrite, UA 07/08/2025 Negative  Negative Final    Urobilinogen, UA 07/08/2025 1.0 E.U./dL  0.2 - 1.0 E.U./dL Final    Magnesium 07/08/2025 2.1  1.6 - 2.4 mg/dL Final    THC, Screen, Urine 07/08/2025 Negative  Negative Final    Phencyclidine (PCP), Urine 07/08/2025 Negative  Negative Final    Cocaine Screen, Urine 07/08/2025 Negative  Negative Final    Methamphetamine, Ur 07/08/2025 Negative  Negative Final    Opiate Screen 07/08/2025 Positive (A)  Negative Final    Amphetamine Screen, Urine 07/08/2025 Negative  Negative Final    Benzodiazepine Screen, Urine 07/08/2025 Negative  Negative Final    Tricyclic Antidepressants Screen 07/08/2025 Negative  Negative Final    Methadone Screen, Urine 07/08/2025 Negative  Negative Final    Barbiturates Screen, Urine 07/08/2025 Negative  Negative Final    Oxycodone Screen, Urine 07/08/2025 Negative  Negative Final    Buprenorphine, Screen, Urine 07/08/2025 Negative  Negative Final    Ethanol 07/08/2025 324 (H)  0 - 10 mg/dL Final    Ethanol % 07/08/2025 0.324  % Final    WBC 07/08/2025 5.20  3.40 - 10.80 10*3/mm3 Final    RBC 07/08/2025 4.35  4.14 - 5.80 10*6/mm3 Final    Hemoglobin 07/08/2025 14.3  13.0 - 17.7 g/dL Final     Hematocrit 07/08/2025 42.8  37.5 - 51.0 % Final    MCV 07/08/2025 98.4 (H)  79.0 - 97.0 fL Final    MCH 07/08/2025 32.9  26.6 - 33.0 pg Final    MCHC 07/08/2025 33.4  31.5 - 35.7 g/dL Final    RDW 07/08/2025 12.7  12.3 - 15.4 % Final    RDW-SD 07/08/2025 45.8  37.0 - 54.0 fl Final    MPV 07/08/2025 9.6  6.0 - 12.0 fL Final    Platelets 07/08/2025 248  140 - 450 10*3/mm3 Final    Neutrophil % 07/08/2025 47.9  42.7 - 76.0 % Final    Lymphocyte % 07/08/2025 43.8  19.6 - 45.3 % Final    Monocyte % 07/08/2025 6.7  5.0 - 12.0 % Final    Eosinophil % 07/08/2025 0.4  0.3 - 6.2 % Final    Basophil % 07/08/2025 0.8  0.0 - 1.5 % Final    Immature Grans % 07/08/2025 0.4  0.0 - 0.5 % Final    Neutrophils, Absolute 07/08/2025 2.49  1.70 - 7.00 10*3/mm3 Final    Lymphocytes, Absolute 07/08/2025 2.28  0.70 - 3.10 10*3/mm3 Final    Monocytes, Absolute 07/08/2025 0.35  0.10 - 0.90 10*3/mm3 Final    Eosinophils, Absolute 07/08/2025 0.02  0.00 - 0.40 10*3/mm3 Final    Basophils, Absolute 07/08/2025 0.04  0.00 - 0.20 10*3/mm3 Final    Immature Grans, Absolute 07/08/2025 0.02  0.00 - 0.05 10*3/mm3 Final    nRBC 07/08/2025 0.0  0.0 - 0.2 /100 WBC Final    Fentanyl, Urine 07/08/2025 Negative  Negative Final    Ethanol 07/09/2025 90 (H)  0 - 10 mg/dL Final    Ethanol % 07/09/2025 0.090  % Final    QT Interval 07/09/2025 384  ms Final    QTC Interval 07/09/2025 464  ms Final        Condition on Discharge:  improved    Vital Signs  Temp:  [97.4 °F (36.3 °C)-98.6 °F (37 °C)] 97.4 °F (36.3 °C)  Heart Rate:  [] 90  Resp:  [16-18] 18  BP: (102-165)/(66-99) 142/90      Discharge Disposition:  Home or Self Care    Discharge Medications:     Discharge Medications        New Medications        Instructions Start Date   FLUoxetine 20 MG capsule  Commonly known as: PROzac   20 mg, Oral, Daily             Continue These Medications        Instructions Start Date   cyclobenzaprine 10 MG tablet  Commonly known as: FLEXERIL   10 mg, Oral, 2 Times  Daily PRN      famotidine 20 MG tablet  Commonly known as: PEPCID   20 mg, Oral, Daily      gabapentin 100 MG capsule  Commonly known as: NEURONTIN   200 mg, Oral, 2 Times Daily      HYDROcodone-acetaminophen 7.5-325 MG per tablet  Commonly known as: NORCO   1 tablet, Oral, Every 12 Hours PRN      metoprolol tartrate 25 MG tablet  Commonly known as: LOPRESSOR   12.5 mg, Daily      Naloxegol Oxalate 25 MG tablet  Commonly known as: MOVANTIK   25 mg, Oral, Daily PRN      ondansetron 4 MG tablet  Commonly known as: ZOFRAN   4 mg, Oral, Every 12 Hours PRN      primidone 50 MG tablet  Commonly known as: MYSOLINE   75 mg, Oral, Nightly      QUEtiapine  MG 24 hr tablet  Commonly known as: SEROquel XR   150 mg, Oral, Nightly PRN      traZODone 100 MG tablet  Commonly known as: DESYREL   100 mg, Oral, Nightly PRN      Trelegy Ellipta 100-62.5-25 MCG/ACT inhaler  Generic drug: Fluticasone-Umeclidin-Vilant   1 puff, Daily - RT             Stop These Medications      methocarbamol 750 MG tablet  Commonly known as: ROBAXIN     promethazine 25 MG tablet  Commonly known as: PHENERGAN              Discharge Diet: Regular     Activity at Discharge: As tolerated     Follow-up Appointments  [facility]    Time: I spent  > 30  minutes on this discharge activity which included: face-to-face encounter with the patient, reviewing the data in the system, coordination of the care with the nursing staff as well as consultants, documentation, and entering orders.        Clinician:   Sundar Zapien MD  07/14/25  13:25 EDT    Electronically signed by Sundar Zapien MD at 07/14/25 5232